# Patient Record
Sex: MALE | Race: BLACK OR AFRICAN AMERICAN | Employment: OTHER | URBAN - METROPOLITAN AREA
[De-identification: names, ages, dates, MRNs, and addresses within clinical notes are randomized per-mention and may not be internally consistent; named-entity substitution may affect disease eponyms.]

---

## 2018-03-12 DIAGNOSIS — R91.1 PULMONARY NODULE: Primary | ICD-10-CM

## 2018-03-13 ENCOUNTER — TELEPHONE (OUTPATIENT)
Dept: PULMONOLOGY | Age: 48
End: 2018-03-13

## 2018-03-19 ENCOUNTER — OFFICE VISIT (OUTPATIENT)
Dept: PULMONOLOGY | Age: 48
End: 2018-03-19
Payer: COMMERCIAL

## 2018-03-19 ENCOUNTER — HOSPITAL ENCOUNTER (OUTPATIENT)
Dept: CT IMAGING | Age: 48
Discharge: HOME | End: 2018-03-21
Payer: COMMERCIAL

## 2018-03-19 VITALS — OXYGEN SATURATION: 94 % | BODY MASS INDEX: 45.1 KG/M2 | WEIGHT: 315 LBS | HEIGHT: 70 IN

## 2018-03-19 DIAGNOSIS — R91.1 PULMONARY NODULE: Primary | ICD-10-CM

## 2018-03-19 DIAGNOSIS — J41.1 MUCOPURULENT CHRONIC BRONCHITIS (HCC): ICD-10-CM

## 2018-03-19 DIAGNOSIS — D35.01 ADENOMA OF RIGHT ADRENAL GLAND: ICD-10-CM

## 2018-03-19 DIAGNOSIS — G47.33 OBSTRUCTIVE SLEEP APNEA SYNDROME: ICD-10-CM

## 2018-03-19 DIAGNOSIS — R91.1 PULMONARY NODULE: ICD-10-CM

## 2018-03-19 DIAGNOSIS — F17.200 SMOKER: ICD-10-CM

## 2018-03-19 LAB
DLCO %PRED: NORMAL
DLCO/VA %PRED: NORMAL
DLCO: NORMAL ML/MMHG SEC
FEF 25-75% PRE PRED: 68
FEF 25-75%-PRE: 2.18
FEV1 %PRED-PRE: 85
FEV1/FVC PRED: 92
FEV1/FVC: 74 %
FEV1: 2.92 LITERS
FEV3 PRE: 3.69
FVC %PRED-PRE: 93
FVC: 3.96 LITERS
MEP: NORMAL
MIP: NORMAL
PEF %PRED-PRE: NORMAL
PEF-PRE: 467.8 L/SEC
TLC %PRED: NORMAL
TLC: NORMAL LITERS

## 2018-03-19 PROCEDURE — 6360000002 HC RX W HCPCS

## 2018-03-19 PROCEDURE — 71250 CT THORAX DX C-: CPT

## 2018-03-19 PROCEDURE — 99205 OFFICE O/P NEW HI 60 MIN: CPT | Performed by: INTERNAL MEDICINE

## 2018-03-19 PROCEDURE — 94010 BREATHING CAPACITY TEST: CPT | Performed by: INTERNAL MEDICINE

## 2018-03-19 PROCEDURE — 99203 OFFICE O/P NEW LOW 30 MIN: CPT | Performed by: INTERNAL MEDICINE

## 2018-03-19 PROCEDURE — 90670 PCV13 VACCINE IM: CPT | Performed by: INTERNAL MEDICINE

## 2018-03-19 PROCEDURE — G0009 ADMIN PNEUMOCOCCAL VACCINE: HCPCS

## 2018-03-19 PROCEDURE — 90670 PCV13 VACCINE IM: CPT

## 2018-03-19 RX ORDER — CHOLECALCIFEROL (VITAMIN D3) 1250 MCG
1 CAPSULE ORAL DAILY
COMMUNITY

## 2018-03-19 RX ORDER — VALSARTAN 320 MG/1
320 TABLET ORAL DAILY
COMMUNITY
End: 2021-06-24

## 2018-03-19 ASSESSMENT — PULMONARY FUNCTION TESTS
FEV1/FVC_PREDICTED: 92
FVC: 3.96
FEV1/FVC: 74
FVC_PERCENT_PREDICTED_PRE: 93
FEV1_PERCENT_PREDICTED_PRE: 85
FEV1: 2.92

## 2018-03-19 NOTE — PATIENT INSTRUCTIONS
Patient Education        Learning About Chronic Bronchitis  What is chronic bronchitis? Chronic bronchitis is long-term swelling and the buildup of mucus in the airways of your lungs. The airways (bronchial tubes) get inflamed and make a lot of mucus. This can narrow or block the airways, making it hard for you to breathe. It is a form of COPD (chronic obstructive pulmonary disease). Chronic bronchitis is usually caused by smoking. But chemical fumes, dust, or air pollution also can cause it over time. What can you expect when you have chronic bronchitis? Chronic bronchitis gets worse over time. You cannot undo the damage to your lungs. Over time, you may find that:  · You get short of breath even when you do simple things like get dressed or fix a meal.  · It is hard to eat or exercise. · You lose weight and feel weaker. Over many years, the swelling and mucus from chronic bronchitis make it more likely that you will get lung infections. But there are things you can do to prevent more damage and feel better. What are the symptoms? The main symptoms of chronic bronchitis are:  · A cough that will not go away. · Mucus that comes up when you cough. · Shortness of breath that gets worse when you exercise. At times, your symptoms may suddenly flare up and get much worse. This is a called an exacerbation (say \"egg-GRACY-er-BAY-omkar\"). When this happens, your usual symptoms quickly get worse and stay bad. This can be dangerous. You may have to go to the hospital.  How can you keep chronic bronchitis from getting worse? Don't smoke. That is the best way to keep chronic bronchitis from getting worse. If you already smoke, it is never too late to stop. If you need help quitting, talk to your doctor about stop-smoking programs and medicines. These can increase your chances of quitting for good. You can do other things to keep chronic bronchitis from getting worse:  · Avoid bad air.  Air pollution, chemical is right for you. Rehab includes exercise programs, education about your disease and how to manage it, help with diet and other changes, and emotional support. · Eat regular, healthy meals. Use bronchodilators about 1 hour before you eat to make it easier to eat. Eat several small meals instead of three large ones. Drink beverages at the end of the meal. Avoid foods that are hard to chew. Follow-up care is a key part of your treatment and safety. Be sure to make and go to all appointments, and call your doctor if you are having problems. It's also a good idea to know your test results and keep a list of the medicines you take. Where can you learn more? Go to https://Plum.iopeZipList.J[employer]J Solutions. org and sign in to your Battlefy account. Enter P729 in the [facility] box to learn more about \"Learning About Chronic Bronchitis. \"     If you do not have an account, please click on the \"Sign Up Now\" link. Current as of: May 12, 2017  Content Version: 11.5  © 3932-1023 [employer]. Care instructions adapted under license by [facility] ([facility]). If you have questions about a medical condition or this instruction, always ask your healthcare professional. [place] [address] any warranty or liability for your use of this information. Patient Education          pneumococcal 13-valent conjugate vaccine  Pronunciation:  SAVANNAH danielle-VAY beatriz martinez VAX een  Brand:  Prevnar 15  What is the most important information I should know about this vaccine? For children, the pneumococcal 13-valent vaccine is given in a series of shots. The first shot is usually given when the child is 3 months old. The booster shots are then given at 4 months, 6 months, and 15to 13months of age. Adults usually receive only one dose of the vaccine.   In a child older than 6 months who has not yet received this vaccine, the first dose can be given any time from the age of 10 months through 5 years injection in a doctor's office or clinic setting. For children, the pneumococcal 13-valent vaccine is given in a series of shots. The first shot is usually given when the child is 3 months old. The booster shots are then given at 4 months, 6 months, and 15to 13months of age. Adults usually receive only one dose of the vaccine. The first injection should be given no earlier than 10weeks of age. Allow at least 2 months to pass between injections. If your child is older than 6 months, he or she can still receive this vaccine on the following schedule:  · Age 7-11 months: two injections at least 4 weeks apart, followed by a third injection after the child turns 1 year (at least 2 months after the second injection); · Age 12-23 months: two injections at least 2 months apart;  · Age 19 months to 5 years (before the 7th birthday): one injection. The timing of this vaccination is very important for it to be effective. Your child's individual booster schedule may be different from these guidelines. Follow your doctor's instructions or the schedule recommended by the health department of the state you live in. Your doctor may recommend treating fever and pain with an aspirin-free pain reliever such as acetaminophen (Tylenol) or ibuprofen (Motrin, Advil, and others) when the shot is given and for the next 24 hours. Follow the label directions or your doctor's instructions about how much of this medicine to give your child. It is especially important to prevent fever from occurring in a child who has a seizure disorder such as epilepsy. Be sure to keep your child on a regular schedule for other immunizations such as diphtheria, tetanus, pertussis (whooping cough), hepatitis, and varicella (chicken pox). Your doctor or state health department can provide you with a recommended immunization schedule. What happens if I miss a dose? Contact your doctor if your child will miss a booster dose or gets behind schedule.  The Services at [phone]. What other drugs will affect this vaccine? Before receiving this vaccine, tell the doctor about all other vaccines you or your child have recently received. Also tell the doctor if you or your child have recently received drugs or treatments that can weaken the immune system, including:  · an oral, nasal, inhaled, or injectable steroid medicine;  · chemotherapy or radiation;  · medications to treat psoriasis, rheumatoid arthritis, or other autoimmune disorders, such as azathioprine (Imuran), etanercept (Enbrel), leflunomide ([address]), and others; or  · medicines to treat or prevent organ transplant rejection, such as basiliximab (Simulect), cyclosporine (Sandimmune, Neoral, Gengraf), muromonab CD3 (Orthoclone), mycophenolate mofetil (CellCept), sirolimus (Rapamune), or tacrolimus (Prograf). If you are using any of these medications, you may not be able to receive the vaccine, or may need to wait until the other treatments are finished. There may be other drugs that can interact with pneumococcal 13-valent vaccine. Tell your doctor about all medications you use. This includes prescription, over-the-counter, vitamin, and herbal products. Do not start a new medication without telling your doctor. Where can I get more information? Your doctor or pharmacist can provide more information about this vaccine. Additional information is available from your local health department or the Centers for Disease Control and Prevention. Remember, keep this and all other medicines out of the reach of children, never share your medicines with others, and use this medication only for the indication prescribed. Every effort has been made to ensure that the information provided by [address] is accurate, up-to-date, and complete, but no guarantee is made to that effect. Drug information contained herein may be time sensitive.  Multum information has been compiled for use by

## 2018-03-19 NOTE — PROGRESS NOTES
PLAN:    His current description of his cough and his active smoking is consistent with the diagnosis of chronic bronchitis. The CT scan shows some's subtle changes such as signet rings and increasing bronchial thickening. I started him on bronchodilators with samples of Belvespi 2 puffs twice a day. We did a PSA screen - at his request. I discussed weight loss with him and recommended dietary counseling. Smoking sensation was discussed and a referral to her smoking sensation program.  As for the lung nodules, I am unconcerned or worried about the lesions which we did review in the office on Epic we discussed a repeat CT scan in 1 year may be reasonable. We reviewed his vaccines and administered the Prevnar 13. We also discussed his sleep apnea and given that his index was greater than 90 and he is morbidly obese recommend evaluation for obesity hypoventilation syndrome and requested full pulmonary function assessment with arterial blood gas on room air. I will have him seen by the  to see if he can get prescription assistance due to his unstable work environment. An S4 the abnormal adrenal gland lesion and MRI for adenoma protocol likely will be enough to eliminate further diagnostic testing. We hope this updates you on our evaluation and clinical thinking. Thank you for entrusting us to participate in [facility]. If you have any questions, please don't hesitate to call us at the [facility].          Sincerely,      Katie Mcnally D.O., MPH, [facility]   of Internal Medicine  Director of [employer]

## 2018-03-30 ENCOUNTER — HOSPITAL ENCOUNTER (OUTPATIENT)
Age: 48
Discharge: HOME | End: 2018-04-01
Payer: COMMERCIAL

## 2018-03-30 LAB
ALBUMIN SERPL-MCNC: 3.8 G/DL (ref 3.5–5.2)
ALP BLD-CCNC: 48 U/L (ref 40–129)
ALT SERPL-CCNC: 8 U/L (ref 0–40)
ANION GAP SERPL CALCULATED.3IONS-SCNC: 13 MMOL/L (ref 7–16)
AST SERPL-CCNC: 15 U/L (ref 0–39)
BASOPHILS ABSOLUTE: 0.02 E9/L (ref 0–0.2)
BASOPHILS RELATIVE PERCENT: 0.3 % (ref 0–2)
BILIRUB SERPL-MCNC: 0.4 MG/DL (ref 0–1.2)
BUN BLDV-MCNC: 8 MG/DL (ref 6–20)
CALCIUM SERPL-MCNC: 9.4 MG/DL (ref 8.6–10.2)
CHLORIDE BLD-SCNC: 103 MMOL/L (ref 98–107)
CHOLESTEROL, TOTAL: 126 MG/DL (ref 0–199)
CO2: 27 MMOL/L (ref 22–29)
CREAT SERPL-MCNC: 0.9 MG/DL (ref 0.7–1.2)
EOSINOPHILS ABSOLUTE: 0.47 E9/L (ref 0.05–0.5)
EOSINOPHILS RELATIVE PERCENT: 6.8 % (ref 0–6)
GFR AFRICAN AMERICAN: >60
GFR NON-AFRICAN AMERICAN: >60 ML/MIN/1.73
GLUCOSE BLD-MCNC: 112 MG/DL (ref 74–109)
HCT VFR BLD CALC: 44 % (ref 37–54)
HDLC SERPL-MCNC: 51 MG/DL
HEMOGLOBIN: 14.1 G/DL (ref 12.5–16.5)
IMMATURE GRANULOCYTES #: 0.01 E9/L
IMMATURE GRANULOCYTES %: 0.1 % (ref 0–5)
LDL CHOLESTEROL CALCULATED: 58 MG/DL (ref 0–99)
LYMPHOCYTES ABSOLUTE: 2.8 E9/L (ref 1.5–4)
LYMPHOCYTES RELATIVE PERCENT: 40.5 % (ref 20–42)
MCH RBC QN AUTO: 28.7 PG (ref 26–35)
MCHC RBC AUTO-ENTMCNC: 32 % (ref 32–34.5)
MCV RBC AUTO: 89.6 FL (ref 80–99.9)
MONOCYTES ABSOLUTE: 0.59 E9/L (ref 0.1–0.95)
MONOCYTES RELATIVE PERCENT: 8.5 % (ref 2–12)
NEUTROPHILS ABSOLUTE: 3.03 E9/L (ref 1.8–7.3)
NEUTROPHILS RELATIVE PERCENT: 43.8 % (ref 43–80)
PDW BLD-RTO: 16.2 FL (ref 11.5–15)
PLATELET # BLD: 259 E9/L (ref 130–450)
PMV BLD AUTO: 10.3 FL (ref 7–12)
POTASSIUM SERPL-SCNC: 4.4 MMOL/L (ref 3.5–5)
RBC # BLD: 4.91 E12/L (ref 3.8–5.8)
SODIUM BLD-SCNC: 143 MMOL/L (ref 132–146)
TOTAL PROTEIN: 7 G/DL (ref 6.4–8.3)
TRIGL SERPL-MCNC: 85 MG/DL (ref 0–149)
TSH SERPL DL<=0.05 MIU/L-ACNC: 1.58 UIU/ML (ref 0.27–4.2)
VITAMIN D 25-HYDROXY: 27 NG/ML (ref 30–100)
VLDLC SERPL CALC-MCNC: 17 MG/DL
WBC # BLD: 6.9 E9/L (ref 4.5–11.5)

## 2018-03-30 PROCEDURE — 80061 LIPID PANEL: CPT

## 2018-03-30 PROCEDURE — 85025 COMPLETE CBC W/AUTO DIFF WBC: CPT

## 2018-03-30 PROCEDURE — 80053 COMPREHEN METABOLIC PANEL: CPT

## 2018-03-30 PROCEDURE — 82306 VITAMIN D 25 HYDROXY: CPT

## 2018-03-30 PROCEDURE — 84443 ASSAY THYROID STIM HORMONE: CPT

## 2018-06-26 DIAGNOSIS — F17.200 SMOKER: ICD-10-CM

## 2018-07-30 ENCOUNTER — OFFICE VISIT (OUTPATIENT)
Dept: PULMONOLOGY | Age: 48
End: 2018-07-30
Payer: COMMERCIAL

## 2018-07-30 VITALS
HEART RATE: 95 BPM | WEIGHT: 315 LBS | TEMPERATURE: 100 F | BODY MASS INDEX: 45.1 KG/M2 | RESPIRATION RATE: 16 BRPM | HEIGHT: 70 IN | OXYGEN SATURATION: 94 % | SYSTOLIC BLOOD PRESSURE: 169 MMHG | DIASTOLIC BLOOD PRESSURE: 83 MMHG

## 2018-07-30 DIAGNOSIS — J20.9 ACUTE BRONCHITIS, UNSPECIFIED ORGANISM: Primary | ICD-10-CM

## 2018-07-30 DIAGNOSIS — J41.1 MUCOPURULENT CHRONIC BRONCHITIS (HCC): ICD-10-CM

## 2018-07-30 DIAGNOSIS — R91.1 PULMONARY NODULE: ICD-10-CM

## 2018-07-30 DIAGNOSIS — G47.33 OBSTRUCTIVE SLEEP APNEA SYNDROME: ICD-10-CM

## 2018-07-30 LAB
DLCO %PRED: NORMAL
DLCO/VA %PRED: NORMAL
DLCO: NORMAL ML/MMHG SEC
FEF 25-75% PRE PRED: 61
FEF 25-75%-PRE: 1.95
FEV1 %PRED-PRE: 77
FEV1/FVC PRED: 80
FEV1/FVC: 74 %
FEV1: 2.62 LITERS
FEV3 PRE: 3.36
FVC %PRED-PRE: 82
FVC: 3.52 LITERS
MEP: NORMAL
MIP: NORMAL
PEF %PRED-PRE: NORMAL
PEF-PRE: NORMAL L/SEC
TLC %PRED: NORMAL
TLC: NORMAL LITERS

## 2018-07-30 PROCEDURE — G8417 CALC BMI ABV UP PARAM F/U: HCPCS | Performed by: INTERNAL MEDICINE

## 2018-07-30 PROCEDURE — 99213 OFFICE O/P EST LOW 20 MIN: CPT | Performed by: INTERNAL MEDICINE

## 2018-07-30 PROCEDURE — 1036F TOBACCO NON-USER: CPT | Performed by: INTERNAL MEDICINE

## 2018-07-30 PROCEDURE — 94375 RESPIRATORY FLOW VOLUME LOOP: CPT | Performed by: INTERNAL MEDICINE

## 2018-07-30 PROCEDURE — 99215 OFFICE O/P EST HI 40 MIN: CPT | Performed by: INTERNAL MEDICINE

## 2018-07-30 PROCEDURE — G8427 DOCREV CUR MEDS BY ELIG CLIN: HCPCS | Performed by: INTERNAL MEDICINE

## 2018-07-30 PROCEDURE — G8925 SPIR FEV1/FVC>=60% & NO COPD: HCPCS | Performed by: INTERNAL MEDICINE

## 2018-07-30 PROCEDURE — 94010 BREATHING CAPACITY TEST: CPT | Performed by: INTERNAL MEDICINE

## 2018-07-30 PROCEDURE — 3023F SPIROM DOC REV: CPT | Performed by: INTERNAL MEDICINE

## 2018-07-30 RX ORDER — PREDNISONE 10 MG/1
10 TABLET ORAL DAILY
Qty: 30 TABLET | Refills: 0 | Status: SHIPPED | OUTPATIENT
Start: 2018-07-30 | End: 2018-08-29

## 2018-07-30 RX ORDER — ALBUTEROL SULFATE 90 UG/1
2 AEROSOL, METERED RESPIRATORY (INHALATION) EVERY 4 HOURS PRN
Qty: 1 INHALER | Refills: 6 | Status: SHIPPED | OUTPATIENT
Start: 2018-07-30 | End: 2018-08-29

## 2018-07-30 RX ORDER — LEVOFLOXACIN 750 MG/1
750 TABLET ORAL DAILY
Qty: 14 TABLET | Refills: 0 | Status: SHIPPED | OUTPATIENT
Start: 2018-07-30 | End: 2018-08-09

## 2018-07-30 ASSESSMENT — PULMONARY FUNCTION TESTS
FEV1/FVC_PREDICTED: 80
FEV1/FVC: 74
FVC_PERCENT_PREDICTED_PRE: 82
FEV1_PERCENT_PREDICTED_PRE: 77
FEV1: 2.62
FVC: 3.52

## 2018-07-30 NOTE — PROGRESS NOTES
Pulmonary [address] [facility]    Department of Internal Medicine  Division of Pulmonary, Critical Care and Sleep Medicine      Dear Pawel Hong MD    We had the pleasure of seeing Garret Ann, in the [address] at [facility] regarding his lung nodule, sleep apnea, cough consistent with chronic bronchitis and morbid obesity. HISTORY OF PRESENT ILLNESS:    Garret Ann is a 49 y.o. male same morbidly obese -American gentleman who presents for evaluation of a cough that has been persistent for about 1 year. States the cough recently got worse with green mucus production over the past few months. He is a one pack per week smoker and is been smoking more since having difficulty with regular employment. He is a  by trade and [employer]. There is no extensive family history of chronic obstructive pulmonary disease noted. He did not have asthma as a child. He has no known allergies except for nonsteroidal's. For the cough he was placed on albuterol and a chest x-ray was performed showing a right mid lung field pulmonary nodule. With the above issues pulmonary consultation was requested. In addition to the above he has known sleep apnea. He has an index of greater than 90 with nocturnal hypoxemia in 2014 I recommended CPAP at 9 cm of water pressure he currently states he is on 15 cm of water pressure and is followed by Dr. Juan Francisco Blanco in his sleep laboratory. Significant weight gain has been noted over the past 6 months. Strong family history of breast cancer noted. On today's visit, Garret Ann was seen emergently at your request. Jerevikram Vazquezjocelin did not return for two appointment nor did he get all his testing done as requested last visit. Dispite all this he recently has been SOB with worsening cough and wheezing. He did quit smoking since the last visit after finding out about the lung nodule. He denies any chest pain.   He has no pleurisy, hemoptysis or weight loss. He denies any leg swelling. He lung function has declined 17% since the last visit. ALLERGIES:    Allergies   Allergen Reactions    Naproxen        PAST MEDICAL HISTORY:       Diagnosis Date    Adrenal adenoma     Family history of breast cancer     Hypertension     Morbid obesity ([address]     Mucopurulent chronic bronchitis ([facility] [address] 3/19/2018    Obstructive sleep apnea syndrome 3/19/2018    Pulmonary nodule 3/19/2018    Sleep apnea     Smoker         MEDICATIONS:   Current Outpatient Prescriptions   Medication Sig Dispense Refill    levofloxacin (LEVAQUIN) 750 MG tablet Take 1 tablet by mouth daily for 10 days 14 tablet 0    predniSONE (DELTASONE) 10 MG tablet Take 1 tablet by mouth daily Take 4 tabs x 3 days, 3 tabs x 3 days, 2 tabs x 3 days, 1 tab x 3 days, stop. 30 tablet 0    Fluticasone-Umeclidin-Vilant 100-62.5-25 MCG/INH AEPB Inhale 100 mcg into the lungs daily 1 each 0    albuterol sulfate HFA (PROAIR HFA) 108 (90 Base) MCG/ACT inhaler Inhale 2 puffs into the lungs every 4 hours as needed for Wheezing or Shortness of Breath 1 Inhaler 6    HYDROcodone-homatropine (HYCODAN) 5-1.5 MG/5ML syrup Take 2.5 mLs by mouth 4 times daily as needed (cough) for up to 7 days. . 120 mL 0    glycopyrrolate-formoterol (BEVESPI) 9-4.8 MCG/ACT AERO Administered as two inhalations taken twice daily in the morning and in the evening by the orally inhaled route only. 1 Inhaler 0    Cholecalciferol (VITAMIN D3) 50059 units CAPS Take 1 capsule by mouth daily      hydrochlorothiazide (HYDRODIURIL) 25 MG tablet Take 25 mg by mouth daily.  valsartan (DIOVAN) 320 MG tablet Take 320 mg by mouth daily      famotidine (PEPCID) 20 MG tablet Take 20 mg by mouth 2 times daily.  verapamil (CALAN) 120 MG tablet Take 120 mg by mouth once.  famotidine (PEPCID) 20 MG tablet Take 1 tablet by mouth 2 times daily for 7 days.  14 tablet 0     No current infections. Nose/Throat: Patient denies history of rhinitis, chronic nasal discharge, drainage, epistaxis, obstruction or nasal polyps. Patient denies history of soreness of mouth or tongue. Patient denies history of hoarseness, voice changes, sore throats or recurrent tonsillitis. Dental surgery with extraction and perioral abscess of the neck requiring drainage. Lymphatic:  Patient denies history of enlargement, inflammation, pain or suppuration. He denies history of lymphoma. Cardiovascular:  Patient denies history of palpations, heart murmur, irregular rhythm, chest pain. He denies orthopnea, rheumatic fever, scarlet fever, cold extremities. He denies edema. Pulmonary:  Patient admits to exertional dyspnea, nocturnal dyspnea. He complains of cough. He complains of hemoptysis or pleurisy. He complains of sleep disorder. He reports symptoms of sleep apnea. CPAP 15 cm H20. Gastrointestinal: Patient denies changes in appetite. He denies dysphagia, odynophagia or abdominal pain. He denies hematochezia, melena, bowel habit changes or hemorrhoids. Allergy/Immunology: The patient denies itching, hives, or angioedema. The patient admits to a problem with seasonal/environmental allergies. Genitourinary:  The patient denies a history of stones or UTI. Vascular: No history of peripheral vascular disease, carotid occlusive disease or aneurysms. Musculoskeletal: The patient reports a history of arthritis & joint pains. He denies loss of strength. Breasts: He denies history of masses, lumps, pain, or nipple changes. The patient denies a history of breast cancer. Neurological: Patient denies vertigo. He denies twitching, convulsions, loss of consciousness. No memory problems. Psychological: Patient denies moodiness, depression or anxiety. He denies obsessions, delusions, illusions or hallucinations. Cancer: No history of cancer reported. Endocrine: No history of goiter.  No history of thyroid disorders. He denies diabetes. Hematopoietic:  He denies history of bleeding disorders or easy bruising. He denies hypercoagulable disorder. Integumentory: No skin lesion, rashes, venous stasis or varicose veins. They denies any report of skin cancer. Rheumatic:  The patient denies polyarthritis or inflammatory joint disease. PHYSICAL EXAMINATION:   Vitals:    07/30/18 1606   BP: (!) 169/83   Site: Left Arm   Position: Sitting   Pulse: 95   Resp: 16   Temp: 100 °F (37.8 °C)   TempSrc: Oral   SpO2: 94%   Weight: (!) 355 lb (161 kg)   Height: 5' 10\" (1.778 m)     Constitutional: A morbidly obese 49 y.o. male who is alert, oriented, cooperative and in no apparent distress. Head was normocephalic and atraumatic. EENT: EOMI DWAYNE. MMM. No icterus. No conjunctival injections. External canals are patent and no discharge was appreciated. Septum was midline, mucosa was without erythema, exudates or cobblestoning. No thrush. Neck: Supple without thyromegaly. No elevated JVP. Trachea was midline. No carotid bruits. Respiratory: Symmetrical without dullness to percussion. Bilaterally diffuse wheezes. No rhonchi or rales. No intercostal retraction or use of accessory muscles. No egophony. Cardiovascular: Nonpalpable PMI. Regular without murmur, clicks, gallops or rubs. No left or right ventricular heave. Pulses:  Carotid, radial and femoral pulses were equal bilaterally. Abdomen: Obese, rounded and soft without organomegaly. No rebound, rigidity. Lymphatic: No lymphadenopathy. Musculoskeletal: Ambulates without assistance. Normal curvature of the spine. No gross muscle weakness. Muscle size, tone and strength are normal. No involuntary movements. Extremities:  No lower extremity edema, ulcerations, tenderness, varicosities or erythema. Coordination appears adequate. Sensory function appears intact. Deep tendon reflexes are normal.   Skin:  Warm and dry.   Examination of color, turgor and pigmentation was relatively normal. No bruises or skin rashes. Old surgical scars are noted. Neurological/Psychiatric: General behavior, level of consciousness, thought content is normal. The patient's emotional status is normal.  Cranial nerves II-XII are intact. DATA:   The data collected below information that was obtained, reviewed, analyzed and interpreted today. Todays Office Spirometry was compared to previous test if available and demonstrates an FVC of 3.52 liters which is 82 % of predicted with an FEV1 of 2.62 liters which is 77 which is down from 85 % of predicted. FEV1/FVC ratio is 74 %. Mid expiratory flow rates are 49 % of predicted. Maximum voluntary ventilation is 134 liters per minute or 86 % of predicted. Flow volume loop shows no signs of intrathoracic or extrathoracic process. Impression: New airflow obstruction. SLEEP STUDY: 2013:  IMPRESSION:  1. Morbid obesity. 2.    Hypersomnolence indicated by sleep onset of 8 minutes. 3.    Fragmented sleep. 4.    Frequent arousals. 5.    Severe sleep apnea syndrome with an AHI of 80.   6.    No cardiac dysrhythmias. 7.    No leg movement disorder. RECOMMENDATIONS: 1. Weight loss. 2.    Start CPAP at 9 cm of water pressure using a medium wide ResMed FX    nasal mask with heat humidification. 3.    Counseled on driving or using heavy equipment until the sleep disorder  is treated. CT SCAN CHEST [3/2018]: Right lun. Pulmonary nodule (series 3, image 45), measuring approximately 0.37 cm. 2. Pulmonary nodule (series 3, image 35), measuring approximately 0.47 cm. 3. No focal infiltrate/pneumonia, pneumothorax or pleural effusion is identified. Left lun. No noncalcified pulmonary nodule, spiculated mass, pleural effusion, or pneumothorax is identified. No supraclavicular, axillary, mediastinal lymphadenopathy. Hilar lymphadenopathy is difficult to ascertain given the lack of IV contrast administration. . Visualized portions of the thyroid, heart, esophagus, stomach, gallbladder, liver, left adrenals, spleen, pancreas, pancreatic duct, common bile duct and visualized kidneys are unremarkable. Vertebral body heights and intervertebral disc spaces are well-preserved. There is normal sagittal alignment of the visualized spine. No lytic or blastic bony lesions. . Nodular abnormality projecting from the right adrenal gland, demonstrating Hounsfield unit on average characterization of 8.2 on average. Lesion measures approximately 1.9 cm longitudinal by 1.2 cm transverse. Hemoglobin/Hematocrit:    Lab Results   Component Value Date    HGB 14.1 03/30/2018    HCT 44.0 03/30/2018     BUN/Creatinine:    Lab Results   Component Value Date    BUN 8 03/30/2018    CREATININE 0.9 03/30/2018          IMPRESSION:      Jazmyn Zavaleta is a 49 y.o. male with descriptive evidence of chronic bronchitis who was an active smoker with a abnormal x-ray revealing a right middle lung field nodule which prompted a CT scan of the chest revealing small sub-centimeter nodules and adrenal adenoma. He was seen back with acute exacerbation of COPD. With this in mind, we would like to proceed with the following;                      PLAN:     We discussed that the current plan is steroids, cough medication and antibiotics for two weeks then return to the office to reevaluate the treatment and resume the work up. We changed the Bevespi to Trelegy till seen and made sure he has a rescue inhaler. His current description of his cough and his active smoking is consistent with the diagnosis of chronic bronchitis. The CT scan shows some's subtle changes such as signet rings and increasing bronchial thickening. I started him on bronchodilators with samples of Belvespi 2 puffs twice a day. We did a PSA screen - at his request. I discussed weight loss with him and recommended dietary counseling.  Smoking sensation was discussed and a referral to her smoking sensation program.

## 2018-07-30 NOTE — PATIENT INSTRUCTIONS
before you increase the amount of fluids you drink. · If you have mucus in your airways, it may help to breathe deeply and cough. · Do not smoke or allow others to smoke around you. Smoking can make your wheezing worse. If you need help quitting, talk to your doctor about stop-smoking programs and medicines. These can increase your chances of quitting for good. · Avoid things that may cause your wheezing. These may include colds, smoke, air pollution, dust, pollen, pets, cockroaches, stress, and cold air. When should you call for help? Call 911 anytime you think you may need emergency care. For example, call if:    · You have severe trouble breathing.     · You passed out (lost consciousness).    Call your doctor now or seek immediate medical care if:    · You cough up yellow, dark brown, or bloody mucus (sputum).     · You have new or worse shortness of breath.     · Your wheezing is not getting better or it gets worse after you start taking your medicine.    Watch closely for changes in your health, and be sure to contact your doctor if:    · You do not get better as expected. Where can you learn more? Go to https://Your Body by DesignpepicDefenCall.PayProp. org and sign in to your Doodle Mobile account. Enter 552 1211 in the [facility] box to learn more about \"Wheezing or Bronchoconstriction: Care Instructions. \"     If you do not have an account, please click on the \"Sign Up Now\" link. Current as of: December 6, 2017  Content Version: 11.6  © 8364-8277 [employer], [employer]. Care instructions adapted under license by [facility] ([facility]). If you have questions about a medical condition or this instruction, always ask your healthcare professional. [place] [address] any warranty or liability for your use of this information.

## 2018-08-13 ENCOUNTER — OFFICE VISIT (OUTPATIENT)
Dept: PULMONOLOGY | Age: 48
End: 2018-08-13
Payer: COMMERCIAL

## 2018-08-13 VITALS
RESPIRATION RATE: 16 BRPM | HEIGHT: 70 IN | OXYGEN SATURATION: 96 % | TEMPERATURE: 98.1 F | BODY MASS INDEX: 45.1 KG/M2 | WEIGHT: 315 LBS | HEART RATE: 90 BPM | DIASTOLIC BLOOD PRESSURE: 69 MMHG | SYSTOLIC BLOOD PRESSURE: 129 MMHG

## 2018-08-13 DIAGNOSIS — J45.30 MILD PERSISTENT REACTIVE AIRWAY DISEASE WITHOUT COMPLICATION: ICD-10-CM

## 2018-08-13 DIAGNOSIS — E66.01 MORBID OBESITY (HCC): ICD-10-CM

## 2018-08-13 DIAGNOSIS — R91.1 PULMONARY NODULE: ICD-10-CM

## 2018-08-13 DIAGNOSIS — G47.33 OBSTRUCTIVE SLEEP APNEA SYNDROME: Primary | ICD-10-CM

## 2018-08-13 DIAGNOSIS — E27.9 LESION OF ADRENAL GLAND (HCC): ICD-10-CM

## 2018-08-13 PROBLEM — J45.909 REACTIVE AIRWAY DISEASE: Status: ACTIVE | Noted: 2018-08-13

## 2018-08-13 LAB
DLCO %PRED: NORMAL
DLCO/VA %PRED: NORMAL
DLCO: NORMAL ML/MMHG SEC
FEF 25-75% PRE PRED: 3.18
FEF 25-75%-PRE: 2.76
FEV1 %PRED-PRE: 102
FEV1/FVC PRED: 80
FEV1/FVC: 76 %
FEV1: 3.46 LITERS
FEV3 PRE: 4.22
FVC %PRED-PRE: 107
FVC: 4.55 LITERS
MEP: NORMAL
MIP: NORMAL
PEF %PRED-PRE: 8.88
PEF-PRE: 10.19 L/SEC
TLC %PRED: NORMAL
TLC: NORMAL LITERS

## 2018-08-13 PROCEDURE — 94010 BREATHING CAPACITY TEST: CPT | Performed by: INTERNAL MEDICINE

## 2018-08-13 PROCEDURE — 99213 OFFICE O/P EST LOW 20 MIN: CPT | Performed by: INTERNAL MEDICINE

## 2018-08-13 PROCEDURE — G0009 ADMIN PNEUMOCOCCAL VACCINE: HCPCS

## 2018-08-13 PROCEDURE — G8417 CALC BMI ABV UP PARAM F/U: HCPCS | Performed by: INTERNAL MEDICINE

## 2018-08-13 PROCEDURE — G0009 ADMIN PNEUMOCOCCAL VACCINE: HCPCS | Performed by: INTERNAL MEDICINE

## 2018-08-13 PROCEDURE — 1036F TOBACCO NON-USER: CPT | Performed by: INTERNAL MEDICINE

## 2018-08-13 PROCEDURE — 99215 OFFICE O/P EST HI 40 MIN: CPT | Performed by: INTERNAL MEDICINE

## 2018-08-13 PROCEDURE — G8427 DOCREV CUR MEDS BY ELIG CLIN: HCPCS | Performed by: INTERNAL MEDICINE

## 2018-08-13 PROCEDURE — 90732 PPSV23 VACC 2 YRS+ SUBQ/IM: CPT

## 2018-08-13 PROCEDURE — 6360000002 HC RX W HCPCS

## 2018-08-13 RX ORDER — LOSARTAN POTASSIUM 100 MG/1
100 TABLET ORAL DAILY
COMMUNITY
End: 2020-07-08 | Stop reason: SDUPTHER

## 2018-08-13 ASSESSMENT — PULMONARY FUNCTION TESTS
FEV1_PERCENT_PREDICTED_PRE: 102
FEV1/FVC: 76
FVC: 4.55
FEV1/FVC_PREDICTED: 80
FEV1: 3.46
FVC_PERCENT_PREDICTED_PRE: 107

## 2018-08-13 NOTE — PATIENT INSTRUCTIONS
Patient Education        Asthma in Adults: Care Instructions  Your Care Instructions    During an asthma attack, your airways swell and narrow as a reaction to certain things (triggers). This makes it hard to breathe. You may be able to prevent asthma attacks if you avoid the things that set off your asthma symptoms. Keeping your asthma under control and treating symptoms before they get bad can help you avoid severe attacks. If you can control your asthma, you may be able to do all of your normal daily activities. You may also avoid asthma attacks and trips to the hospital.  Follow-up care is a key part of your treatment and safety. Be sure to make and go to all appointments, and call your doctor if you are having problems. It's also a good idea to know your test results and keep a list of the medicines you take. How can you care for yourself at home? · Follow your asthma action plan so you can manage your symptoms at home. An asthma action plan will help you prevent and control airway reactions and will tell you what to do during an asthma attack. If you do not have an asthma action plan, work with your doctor to build one. · Take your asthma medicine exactly as prescribed. Medicine plays an important role in controlling asthma. Talk to your doctor right away if you have any questions about what to take and how to take it. ¨ Use your quick-relief medicine when you have symptoms of an attack. Quick-relief medicine often is an albuterol inhaler. Some people need to use quick-relief medicine before they exercise. ¨ Take your controller medicine every day, not just when you have symptoms. Controller medicine is usually an inhaled corticosteroid. The goal is to prevent problems before they occur. Do not use your controller medicine to try to treat an attack that has already started. It does not work fast enough to help.   ¨ If your doctor prescribed corticosteroid pills to use during an attack, take them as

## 2018-08-13 NOTE — PROGRESS NOTES
Pulmonary [facility]    Department of Internal Medicine  Division of Pulmonary, Critical Care and Sleep Medicine      Dear Rajni Pinzon MD    We had the pleasure of seeing Ana Rosa Rooney, in the [address] at [facility] regarding his lung nodule, sleep apnea, cough consistent with chronic bronchitis and morbid obesity. HISTORY OF PRESENT ILLNESS:    Ana Rosa Rooney is a 49 y.o. male same morbidly obese -American gentleman who presents for evaluation of a cough that has been persistent for about 1 year. States the cough recently got worse with green mucus production over the past few months. He is a one pack per week smoker and is been smoking more since having difficulty with regular employment. 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 There is no extensive family history of chronic obstructive pulmonary disease noted. He did not have asthma as a child. He has no known allergies except for nonsteroidal's. For the cough he was placed on albuterol and a chest x-ray was performed showing a right mid lung field pulmonary nodule. With the above issues pulmonary consultation was requested. In addition to the above he has known sleep apnea. He has an index of greater than 90 with nocturnal hypoxemia in 2014 I recommended CPAP at 9 cm of water pressure he currently states he is on 15 cm of water pressure and is followed by Dr. Fran Dunlap in his sleep laboratory. Significant weight gain has been noted over the past 6 months. Strong family history of breast cancer noted. On today's visit, Ana Rosa Rooney was seen and is 1000 x improved after his exacerbation. He is on Trelegy once a day. We discussed following up on his PSA, lung nodule and adrenal lesion. He denies any chest pain. He has no pleurisy, hemoptysis or weight loss. He denies any leg swelling.   His lung function is now normal and over 100% of predicted since the last visit. ALLERGIES:    Allergies   Allergen Reactions    Naproxen        PAST MEDICAL HISTORY:       Diagnosis Date    Adrenal adenoma     Family history of breast cancer     Hypertension     Morbid obesity ([address]     Mucopurulent chronic bronchitis ([address] 3/19/2018    Obstructive sleep apnea syndrome 3/19/2018    Pulmonary nodule 3/19/2018    Sleep apnea     Cpap 15 cm BMS    Smoker         MEDICATIONS:   Current Outpatient Prescriptions   Medication Sig Dispense Refill    losartan (COZAAR) 100 MG tablet Take 100 mg by mouth daily      Fluticasone-Umeclidin-Vilant 100-62.5-25 MCG/INH AEPB Inhale 100 mcg into the lungs daily 3 each 4    Fluticasone-Umeclidin-Vilant 100-62.5-25 MCG/INH AEPB Inhale 100 mcg into the lungs daily 1 each 0    albuterol sulfate HFA (PROAIR HFA) 108 (90 Base) MCG/ACT inhaler Inhale 2 puffs into the lungs every 4 hours as needed for Wheezing or Shortness of Breath 1 Inhaler 6    Cholecalciferol (VITAMIN D3) 91612 units CAPS Take 1 capsule by mouth daily      hydrochlorothiazide (HYDRODIURIL) 25 MG tablet Take 25 mg by mouth daily.  predniSONE (DELTASONE) 10 MG tablet Take 1 tablet by mouth daily Take 4 tabs x 3 days, 3 tabs x 3 days, 2 tabs x 3 days, 1 tab x 3 days, stop. 30 tablet 0    glycopyrrolate-formoterol (BEVESPI) 9-4.8 MCG/ACT AERO Administered as two inhalations taken twice daily in the morning and in the evening by the orally inhaled route only. 1 Inhaler 0    valsartan (DIOVAN) 320 MG tablet Take 320 mg by mouth daily      famotidine (PEPCID) 20 MG tablet Take 20 mg by mouth 2 times daily.  verapamil (CALAN) 120 MG tablet Take 120 mg by mouth once.  famotidine (PEPCID) 20 MG tablet Take 1 tablet by mouth 2 times daily for 7 days. 14 tablet 0     No current facility-administered medications for this visit. SOCIAL AND OCCUPATIONAL HEALTH:  The patient smokes 1 pack every two weeks. There is no history of TB or TB exposure.   There is disorder. Integumentory: No skin lesion, rashes, venous stasis or varicose veins. They denies any report of skin cancer. Rheumatic:  The patient denies polyarthritis or inflammatory joint disease. PHYSICAL EXAMINATION:   Vitals:    08/13/18 1327   BP: 129/69   Pulse: 90   Resp: 16   Temp: 98.1 °F (36.7 °C)   TempSrc: Oral   SpO2: 96%   Weight: (!) 348 lb (157.9 kg)   Height: 5' 10\" (1.778 m)     Constitutional: A morbidly obese 49 y.o. male who is alert, oriented, cooperative and in no apparent distress. Head was normocephalic and atraumatic. EENT: EOMI DWAYNE. MMM. No icterus. No conjunctival injections. External canals are patent and no discharge was appreciated. Septum was midline, mucosa was without erythema, exudates or cobblestoning. No thrush. Neck: Supple without thyromegaly. No elevated JVP. Trachea was midline. No carotid bruits. Respiratory: Symmetrical without dullness to percussion. Essential clear. No rhonchi or rales. No intercostal retraction or use of accessory muscles. No egophony. Cardiovascular: Nonpalpable PMI. Regular without murmur, clicks, gallops or rubs. No left or right ventricular heave. Pulses:  Carotid, radial and femoral pulses were equal bilaterally. Abdomen: Obese, rounded and soft without organomegaly. No rebound, rigidity. Lymphatic: No lymphadenopathy. Musculoskeletal: Ambulates without assistance. Normal curvature of the spine. No gross muscle weakness. Muscle size, tone and strength are normal. No involuntary movements. Extremities:  No lower extremity edema, ulcerations, tenderness, varicosities or erythema. Coordination appears adequate. Sensory function appears intact. Deep tendon reflexes are normal.   Skin:  Warm and dry. Examination of color, turgor and pigmentation was relatively normal. No bruises or skin rashes. Old surgical scars are noted.    Neurological/Psychiatric: General behavior, level of consciousness, thought content intervertebral disc spaces are well-preserved. There is normal sagittal alignment of the visualized spine. No lytic or blastic bony lesions. . Nodular abnormality projecting from the right adrenal gland, demonstrating Hounsfield unit on average characterization of 8.2 on average. Lesion measures approximately 1.9 cm longitudinal by 1.2 cm transverse. Hemoglobin/Hematocrit:    Lab Results   Component Value Date    HGB 14.1 03/30/2018    HCT 44.0 03/30/2018     BUN/Creatinine:    Lab Results   Component Value Date    BUN 8 03/30/2018    CREATININE 0.9 03/30/2018          IMPRESSION:      Garret Ann is a 49 y.o. male with descriptive evidence of chronic bronchitis who quit smoking in 8/2018 with a abnormal x-ray revealing a right middle lung field nodule which prompted a CT scan of the chest revealing small sub-centimeter nodules and adrenal adenoma. With this in mind, we would like to proceed with the following;                      PLAN:   He is back to baseline after the AECOPD after getting steroids, cough medication and antibiotics for two weeks then return to the office to reevaluate the treatment and resume the work up. As you know we changed the Bevespi to Trelegy and it really made a big difference in his breathing. In March 2018 his CT scan shows some's subtle changes such as signet rings and increasing bronchial thickening. We requested a PSA screen - at his request. I discussed weight loss with him and recommended dietary counseling. Smoking sensation was discussed and a referral to her smoking sensation program - he quit a in March/2018. As for the lung nodules, I am unconcerned or worried about the lesions which we did review in the office on Epic we discussed a repeat CT scan to follow the lesion. We reviewed his vaccines and administered the pneumonia 23.   We also discussed his sleep apnea and given that his index was greater than 90 and he is morbidly obese recommend evaluation for obesity hypoventilation syndrome and requested full pulmonary function assessment with arterial blood gas on room air. He follows with Dr. Donald Moreland - CPAP is at 15 cm H20. As for this abnormal adrenal gland lesion and MRI for adenoma protocol likely will be enough to eliminate further diagnostic testing. We hope this updates you on our evaluation and clinical thinking. Thank you for entrusting us to participate in [facility]. If you have any questions, please don't hesitate to call us at the [employer].        Sincerely,    Yelena Hernandez D.O., MPH, [facility]  Professor of Internal Medicine  Director of [employer]

## 2018-08-13 NOTE — PROGRESS NOTES
Patient to follow up with physician in. Patient given samples of trelegy during office visit under the direction of Dr. Melisa Mcnally. Patient will be ordered an open MRI of abd for adrenal gland eval.  CT Chest without was ordered for a lung nodule.

## 2018-08-30 ENCOUNTER — HOSPITAL ENCOUNTER (OUTPATIENT)
Dept: CT IMAGING | Age: 48
Discharge: HOME | End: 2018-09-01
Payer: COMMERCIAL

## 2018-08-30 ENCOUNTER — HOSPITAL ENCOUNTER (OUTPATIENT)
Age: 48
Discharge: HOME | End: 2018-08-30
Payer: COMMERCIAL

## 2018-08-30 ENCOUNTER — HOSPITAL ENCOUNTER (OUTPATIENT)
Dept: PULMONOLOGY | Age: 48
Discharge: HOME | End: 2018-08-30
Payer: COMMERCIAL

## 2018-08-30 DIAGNOSIS — E66.01 MORBID OBESITY (HCC): ICD-10-CM

## 2018-08-30 DIAGNOSIS — E27.9 LESION OF ADRENAL GLAND (HCC): Primary | ICD-10-CM

## 2018-08-30 DIAGNOSIS — E27.9 LESION OF ADRENAL GLAND (HCC): ICD-10-CM

## 2018-08-30 DIAGNOSIS — G47.33 OBSTRUCTIVE SLEEP APNEA SYNDROME: ICD-10-CM

## 2018-08-30 DIAGNOSIS — J45.30 MILD PERSISTENT REACTIVE AIRWAY DISEASE WITHOUT COMPLICATION: ICD-10-CM

## 2018-08-30 DIAGNOSIS — R91.1 PULMONARY NODULE: ICD-10-CM

## 2018-08-30 LAB — PROSTATE SPECIFIC ANTIGEN: 0.54 NG/ML (ref 0–4)

## 2018-08-30 PROCEDURE — 94726 PLETHYSMOGRAPHY LUNG VOLUMES: CPT | Performed by: INTERNAL MEDICINE

## 2018-08-30 PROCEDURE — 94060 EVALUATION OF WHEEZING: CPT | Performed by: INTERNAL MEDICINE

## 2018-08-30 PROCEDURE — 94726 PLETHYSMOGRAPHY LUNG VOLUMES: CPT

## 2018-08-30 PROCEDURE — 36415 COLL VENOUS BLD VENIPUNCTURE: CPT

## 2018-08-30 PROCEDURE — 71250 CT THORAX DX C-: CPT

## 2018-08-30 PROCEDURE — 94729 DIFFUSING CAPACITY: CPT | Performed by: INTERNAL MEDICINE

## 2018-08-30 PROCEDURE — 94729 DIFFUSING CAPACITY: CPT

## 2018-08-30 PROCEDURE — G0103 PSA SCREENING: HCPCS

## 2018-08-30 PROCEDURE — 94375 RESPIRATORY FLOW VOLUME LOOP: CPT

## 2018-09-13 ENCOUNTER — HOSPITAL ENCOUNTER (OUTPATIENT)
Dept: MRI IMAGING | Age: 48
Discharge: HOME | End: 2018-09-15
Payer: COMMERCIAL

## 2018-09-13 DIAGNOSIS — E27.9 LESION OF ADRENAL GLAND (HCC): ICD-10-CM

## 2018-09-13 LAB
BUN BLDV-MCNC: 13 MG/DL (ref 6–20)
CREAT SERPL-MCNC: 1.1 MG/DL (ref 0.7–1.2)
GFR AFRICAN AMERICAN: >60
GFR NON-AFRICAN AMERICAN: >60 ML/MIN/1.73

## 2018-09-13 PROCEDURE — 82565 ASSAY OF CREATININE: CPT

## 2018-09-13 PROCEDURE — A9579 GAD-BASE MR CONTRAST NOS,1ML: HCPCS | Performed by: RADIOLOGY

## 2018-09-13 PROCEDURE — 84520 ASSAY OF UREA NITROGEN: CPT

## 2018-09-13 PROCEDURE — 36415 COLL VENOUS BLD VENIPUNCTURE: CPT

## 2018-09-13 PROCEDURE — 6360000004 HC RX CONTRAST MEDICATION: Performed by: RADIOLOGY

## 2018-09-13 PROCEDURE — 74183 MRI ABD W/O CNTR FLWD CNTR: CPT

## 2018-09-13 RX ADMIN — GADOTERIDOL 20 ML: 279.3 INJECTION, SOLUTION INTRAVENOUS at 15:17

## 2018-09-25 ENCOUNTER — OFFICE VISIT (OUTPATIENT)
Dept: PULMONOLOGY | Age: 48
End: 2018-09-25
Payer: COMMERCIAL

## 2018-09-25 VITALS
HEART RATE: 103 BPM | DIASTOLIC BLOOD PRESSURE: 70 MMHG | WEIGHT: 315 LBS | OXYGEN SATURATION: 96 % | SYSTOLIC BLOOD PRESSURE: 114 MMHG | BODY MASS INDEX: 45.1 KG/M2 | HEIGHT: 70 IN | RESPIRATION RATE: 16 BRPM | TEMPERATURE: 98.3 F

## 2018-09-25 DIAGNOSIS — J41.1 MUCOPURULENT CHRONIC BRONCHITIS (HCC): ICD-10-CM

## 2018-09-25 DIAGNOSIS — J45.909 REACTIVE AIRWAY DISEASE WITHOUT COMPLICATION, UNSPECIFIED ASTHMA SEVERITY, UNSPECIFIED WHETHER PERSISTENT: ICD-10-CM

## 2018-09-25 DIAGNOSIS — E66.01 MORBID OBESITY (HCC): ICD-10-CM

## 2018-09-25 DIAGNOSIS — R91.1 PULMONARY NODULE: ICD-10-CM

## 2018-09-25 DIAGNOSIS — G47.33 OBSTRUCTIVE SLEEP APNEA SYNDROME: Primary | ICD-10-CM

## 2018-09-25 LAB
DLCO %PRED: NORMAL
DLCO/VA %PRED: NORMAL
DLCO: NORMAL ML/MMHG SEC
FEF 25-75% PRE PRED: NORMAL
FEF 25-75%-PRE: 3.63
FEV1 %PRED-PRE: NORMAL
FEV1/FVC PRED: NORMAL
FEV1/FVC: 82 %
FEV1: 3.72 LITERS
FEV3 PRE: 4.37
FVC %PRED-PRE: NORMAL
FVC: 4.55 LITERS
MEP: NORMAL
MIP: NORMAL
PEF %PRED-PRE: NORMAL
PEF-PRE: NORMAL L/SEC
TLC %PRED: NORMAL
TLC: NORMAL LITERS

## 2018-09-25 PROCEDURE — 3023F SPIROM DOC REV: CPT | Performed by: INTERNAL MEDICINE

## 2018-09-25 PROCEDURE — G8417 CALC BMI ABV UP PARAM F/U: HCPCS | Performed by: INTERNAL MEDICINE

## 2018-09-25 PROCEDURE — G8925 SPIR FEV1/FVC>=60% & NO COPD: HCPCS | Performed by: INTERNAL MEDICINE

## 2018-09-25 PROCEDURE — G8427 DOCREV CUR MEDS BY ELIG CLIN: HCPCS | Performed by: INTERNAL MEDICINE

## 2018-09-25 PROCEDURE — 99213 OFFICE O/P EST LOW 20 MIN: CPT | Performed by: INTERNAL MEDICINE

## 2018-09-25 PROCEDURE — 1036F TOBACCO NON-USER: CPT | Performed by: INTERNAL MEDICINE

## 2018-09-25 PROCEDURE — 94010 BREATHING CAPACITY TEST: CPT | Performed by: INTERNAL MEDICINE

## 2018-09-25 PROCEDURE — 99214 OFFICE O/P EST MOD 30 MIN: CPT | Performed by: INTERNAL MEDICINE

## 2018-09-25 ASSESSMENT — PULMONARY FUNCTION TESTS
FEV1/FVC: 82
FVC: 4.55
FEV1: 3.72

## 2018-09-25 NOTE — PROGRESS NOTES
Patient to follow up with physician in 6 months  . Patient given samples of trelegy during office visit under the direction of Dr. Deonte Ayala. CT Scan in one year.

## 2018-09-25 NOTE — PROGRESS NOTES
Pulmonary [address] [facility]    Department of Internal Medicine  Division of Pulmonary, Critical Care and Sleep Medicine      Dear Pawel Hong MD    We had the pleasure of seeing Garret Ann, in the [address] at [facility] regarding his lung nodule, sleep apnea, cough consistent with chronic bronchitis and morbid obesity. Severe CORAZON on CPAP    HISTORY OF PRESENT ILLNESS:    Garret Ann is a 49 y.o. male same morbidly obese -American gentleman who presents for evaluation of a cough that has been persistent for about 1 year. States the cough recently got worse with green mucus production over the past few months. He is a one pack per week smoker and is been smoking more since having difficulty with regular employment. 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 There is no extensive family history of chronic obstructive pulmonary disease noted. He did not have asthma as a child. He has no known allergies except for nonsteroidal's. For the cough he was placed on albuterol and a chest x-ray was performed showing a right mid lung field pulmonary nodule. With the above issues pulmonary consultation was requested. In addition to the above he has known sleep apnea. He has an index of greater than 90 with nocturnal hypoxemia in 2014 I recommended CPAP at 9 cm of water pressure he currently states he is on 15 cm of water pressure and is followed by Dr. Juan Francisco Blanco in his sleep laboratory. Significant weight gain has been noted over the past 6 months. Strong family history of breast cancer noted. On today's visit, Garret Ann is doing well. We followed and discussed his diagnostic testing. His PSA was normal. Lung nodule is stable and He PFTs have normalized. He quit smoking. He is on Trelegy once a day. The MR for the adrenal lesion report was not adequate - I left a message with radiologist.  He denies any chest pain.   He has no disorder. Integumentory: No skin lesion, rashes, venous stasis or varicose veins. They denies any report of skin cancer. Rheumatic:  The patient denies polyarthritis or inflammatory joint disease. PHYSICAL EXAMINATION:   Vitals:    09/25/18 1359   BP: 114/70   Pulse: 103   Resp: 16   Temp: 98.3 °F (36.8 °C)   TempSrc: Oral   SpO2: 96%   Weight: (!) 345 lb (156.5 kg)   Height: 5' 10\" (1.778 m)     Constitutional: A morbidly obese 49 y.o. male who is alert, oriented, cooperative and in no apparent distress. Head was normocephalic and atraumatic. EENT: EOMI DWAYNE. MMM. No icterus. No conjunctival injections. External canals are patent and no discharge was appreciated. Septum was midline, mucosa was without erythema, exudates or cobblestoning. No thrush. Neck: Supple without thyromegaly. No elevated JVP. Trachea was midline. No carotid bruits. Respiratory: Symmetrical without dullness to percussion. Essential clear. No rhonchi or rales. No intercostal retraction or use of accessory muscles. No egophony. Cardiovascular: Nonpalpable PMI. Regular without murmur, clicks, gallops or rubs. No left or right ventricular heave. Pulses:  Carotid, radial and femoral pulses were equal bilaterally. Abdomen: Obese, rounded and soft without organomegaly. No rebound, rigidity. Lymphatic: No lymphadenopathy. Musculoskeletal: Ambulates without assistance. Normal curvature of the spine. No gross muscle weakness. Muscle size, tone and strength are normal. No involuntary movements. Extremities:  No lower extremity edema. Coordination appears adequate. Sensory function appears intact. Deep tendon reflexes are normal.   Skin:  Warm and dry. Examination of color, turgor and pigmentation was relatively normal. No bruises or skin rashes. Old surgical scars are noted.    Neurological/Psychiatric: General behavior, level of consciousness, thought content is normal. The patient's emotional status is pneumothorax or pleural effusion is identified. Left lun. No noncalcified pulmonary nodule, spiculated mass, pleural effusion, or pneumothorax is identified. No supraclavicular, axillary, mediastinal lymphadenopathy. Hilar lymphadenopathy is difficult to ascertain given the lack of IV contrast administration. . Visualized portions of the thyroid, heart, esophagus, stomach, gallbladder, liver, left adrenals, spleen, pancreas, pancreatic duct, common bile duct and visualized kidneys are unremarkable. Vertebral body heights and intervertebral disc spaces are well-preserved. There is normal sagittal alignment of the visualized spine. No lytic or blastic bony lesions. . Nodular abnormality projecting from the right adrenal gland, demonstrating Hounsfield unit on average characterization of 8.2 on average. Lesion measures approximately 1.9 cm longitudinal by 1.2 cm transverse. Hemoglobin/Hematocrit:    Lab Results   Component Value Date    HGB 14.1 2018    HCT 44.0 2018     BUN/Creatinine:    Lab Results   Component Value Date    BUN 13 2018    CREATININE 1.1 2018          IMPRESSION:      Aaron Rosario is a 49 y.o. male with descriptive evidence of chronic bronchitis who quit smoking in 2018 with a abnormal x-ray revealing a right middle lung field nodule which prompted a CT scan of the chest revealing small sub-centimeter nodules and adrenal adenoma. With this in mind, we would like to proceed with the following;                      PLAN:   He has chronic asthmatic bronchitis and remain with normal pulmonary function tests on [facility]. In 2018 his CT scan shows some's subtle changes such as signet rings and increasing bronchial thickening. Repeat CT scan is stable nodule at 3 mm in size. His PSA screen - was normal. We discussed weight loss with him and recommended dietary counseling.  Smoking sensation was discussed and a referral to her smoking sensation program - he quit a in

## 2019-03-08 LAB
ALBUMIN SERPL-MCNC: NORMAL G/DL
ALP BLD-CCNC: NORMAL U/L
ALT SERPL-CCNC: NORMAL U/L
ANION GAP SERPL CALCULATED.3IONS-SCNC: NORMAL MMOL/L
AST SERPL-CCNC: NORMAL U/L
AVERAGE GLUCOSE: NORMAL
BILIRUB SERPL-MCNC: NORMAL MG/DL
BUN BLDV-MCNC: NORMAL MG/DL
CALCIUM SERPL-MCNC: NORMAL MG/DL
CHLORIDE BLD-SCNC: NORMAL MMOL/L
CHOLESTEROL, TOTAL: NORMAL
CHOLESTEROL/HDL RATIO: NORMAL
CO2: NORMAL
CREAT SERPL-MCNC: NORMAL MG/DL
GFR CALCULATED: NORMAL
GLUCOSE BLD-MCNC: NORMAL MG/DL
HBA1C MFR BLD: 6.2 %
HDLC SERPL-MCNC: NORMAL MG/DL
LDL CHOLESTEROL CALCULATED: NORMAL
NONHDLC SERPL-MCNC: NORMAL MG/DL
POTASSIUM SERPL-SCNC: NORMAL MMOL/L
SODIUM BLD-SCNC: NORMAL MMOL/L
TOTAL PROTEIN: NORMAL
TRIGL SERPL-MCNC: NORMAL MG/DL
VLDLC SERPL CALC-MCNC: NORMAL MG/DL

## 2019-03-26 ENCOUNTER — OFFICE VISIT (OUTPATIENT)
Dept: PULMONOLOGY | Age: 49
End: 2019-03-26

## 2019-03-26 VITALS
HEART RATE: 93 BPM | BODY MASS INDEX: 45.1 KG/M2 | OXYGEN SATURATION: 96 % | RESPIRATION RATE: 18 BRPM | DIASTOLIC BLOOD PRESSURE: 59 MMHG | HEIGHT: 70 IN | SYSTOLIC BLOOD PRESSURE: 125 MMHG | WEIGHT: 315 LBS

## 2019-03-26 DIAGNOSIS — G47.33 OBSTRUCTIVE SLEEP APNEA SYNDROME: Primary | ICD-10-CM

## 2019-03-26 DIAGNOSIS — J41.1 MUCOPURULENT CHRONIC BRONCHITIS (HCC): ICD-10-CM

## 2019-03-26 DIAGNOSIS — R91.1 PULMONARY NODULE: ICD-10-CM

## 2019-03-26 DIAGNOSIS — J45.909 REACTIVE AIRWAY DISEASE WITHOUT COMPLICATION, UNSPECIFIED ASTHMA SEVERITY, UNSPECIFIED WHETHER PERSISTENT: ICD-10-CM

## 2019-03-26 LAB
EXPIRATORY TIME: NORMAL SEC
FEF 25-75% %PRED-PRE: NORMAL L/SEC
FEF 25-75% PRED: NORMAL L/SEC
FEF 25-75%-PRE: NORMAL L/SEC
FEV1 %PRED-PRE: 101 %
FEV1 PRED: 3.37 L
FEV1/FVC %PRED-PRE: 101 %
FEV1/FVC PRED: 80 %
FEV1/FVC: 81 %
FEV1: 3.4 L
FVC %PRED-PRE: 99 %
FVC PRED: 4.23 L
FVC: 4.2 L
PEF %PRED-PRE: NORMAL L/SEC
PEF PRED: NORMAL L/SEC
PEF-PRE: NORMAL L/SEC

## 2019-03-26 PROCEDURE — 94010 BREATHING CAPACITY TEST: CPT | Performed by: INTERNAL MEDICINE

## 2019-03-26 PROCEDURE — 99213 OFFICE O/P EST LOW 20 MIN: CPT | Performed by: INTERNAL MEDICINE

## 2019-03-26 RX ORDER — AZITHROMYCIN 250 MG/1
250 TABLET, FILM COATED ORAL DAILY
Qty: 10 TABLET | Refills: 0 | Status: SHIPPED | OUTPATIENT
Start: 2019-03-26 | End: 2019-04-05

## 2019-03-26 ASSESSMENT — PULMONARY FUNCTION TESTS
FEV1_PERCENT_PREDICTED_PRE: 101
FEV1_PREDICTED: 3.37
FEV1/FVC_PERCENT_PREDICTED_PRE: 101
FVC_PREDICTED: 4.23
FEV1: 3.40
FEV1/FVC: 81
FVC: 4.20
FVC_PERCENT_PREDICTED_PRE: 99
FEV1/FVC_PREDICTED: 80

## 2019-10-15 ENCOUNTER — HOSPITAL ENCOUNTER (OUTPATIENT)
Age: 49
Discharge: HOME | End: 2019-10-15

## 2019-10-15 ENCOUNTER — OFFICE VISIT (OUTPATIENT)
Dept: PULMONOLOGY | Age: 49
End: 2019-10-15

## 2019-10-15 VITALS
RESPIRATION RATE: 18 BRPM | OXYGEN SATURATION: 94 % | DIASTOLIC BLOOD PRESSURE: 64 MMHG | SYSTOLIC BLOOD PRESSURE: 129 MMHG | HEIGHT: 71 IN | HEART RATE: 87 BPM | BODY MASS INDEX: 44.1 KG/M2 | WEIGHT: 315 LBS

## 2019-10-15 DIAGNOSIS — R91.1 PULMONARY NODULE: ICD-10-CM

## 2019-10-15 DIAGNOSIS — J45.41 MODERATE PERSISTENT ASTHMATIC BRONCHITIS WITH ACUTE EXACERBATION: ICD-10-CM

## 2019-10-15 DIAGNOSIS — J41.1 MUCOPURULENT CHRONIC BRONCHITIS (HCC): ICD-10-CM

## 2019-10-15 DIAGNOSIS — G47.33 OBSTRUCTIVE SLEEP APNEA SYNDROME: ICD-10-CM

## 2019-10-15 DIAGNOSIS — G47.33 OBSTRUCTIVE SLEEP APNEA SYNDROME: Primary | ICD-10-CM

## 2019-10-15 LAB
BASOPHILS ABSOLUTE: 0.03 E9/L (ref 0–0.2)
BASOPHILS RELATIVE PERCENT: 0.3 % (ref 0–2)
EOSINOPHILS ABSOLUTE: 0.69 E9/L (ref 0.05–0.5)
EOSINOPHILS RELATIVE PERCENT: 8 % (ref 0–6)
EXPIRATORY TIME: NORMAL SEC
FEF 25-75% %PRED-PRE: NORMAL L/SEC
FEF 25-75% PRED: NORMAL L/SEC
FEF 25-75%-PRE: NORMAL L/SEC
FEV1 %PRED-PRE: 72 %
FEV1 PRED: 3.46 L
FEV1/FVC %PRED-PRE: 93 %
FEV1/FVC PRED: 80 %
FEV1/FVC: 75 %
FEV1: 2.52 L
FVC %PRED-PRE: 77 %
FVC PRED: 4.36 L
FVC: 3.36 L
HCT VFR BLD CALC: 47.6 % (ref 37–54)
HEMOGLOBIN: 14.7 G/DL (ref 12.5–16.5)
IMMATURE GRANULOCYTES #: 0.02 E9/L
IMMATURE GRANULOCYTES %: 0.2 % (ref 0–5)
LYMPHOCYTES ABSOLUTE: 3.68 E9/L (ref 1.5–4)
LYMPHOCYTES RELATIVE PERCENT: 42.6 % (ref 20–42)
MCH RBC QN AUTO: 27.8 PG (ref 26–35)
MCHC RBC AUTO-ENTMCNC: 30.9 % (ref 32–34.5)
MCV RBC AUTO: 90 FL (ref 80–99.9)
MONOCYTES ABSOLUTE: 0.55 E9/L (ref 0.1–0.95)
MONOCYTES RELATIVE PERCENT: 6.4 % (ref 2–12)
NEUTROPHILS ABSOLUTE: 3.67 E9/L (ref 1.8–7.3)
NEUTROPHILS RELATIVE PERCENT: 42.5 % (ref 43–80)
PDW BLD-RTO: 16.7 FL (ref 11.5–15)
PEF %PRED-PRE: NORMAL L/SEC
PEF PRED: NORMAL L/SEC
PEF-PRE: NORMAL L/SEC
PLATELET # BLD: 259 E9/L (ref 130–450)
PMV BLD AUTO: 9.8 FL (ref 7–12)
RBC # BLD: 5.29 E12/L (ref 3.8–5.8)
WBC # BLD: 8.6 E9/L (ref 4.5–11.5)

## 2019-10-15 PROCEDURE — 82785 ASSAY OF IGE: CPT

## 2019-10-15 PROCEDURE — 36415 COLL VENOUS BLD VENIPUNCTURE: CPT

## 2019-10-15 PROCEDURE — 99213 OFFICE O/P EST LOW 20 MIN: CPT | Performed by: INTERNAL MEDICINE

## 2019-10-15 PROCEDURE — 86003 ALLG SPEC IGE CRUDE XTRC EA: CPT

## 2019-10-15 PROCEDURE — 94010 BREATHING CAPACITY TEST: CPT | Performed by: INTERNAL MEDICINE

## 2019-10-15 PROCEDURE — 86255 FLUORESCENT ANTIBODY SCREEN: CPT

## 2019-10-15 PROCEDURE — 85025 COMPLETE CBC W/AUTO DIFF WBC: CPT

## 2019-10-15 PROCEDURE — 99215 OFFICE O/P EST HI 40 MIN: CPT | Performed by: INTERNAL MEDICINE

## 2019-10-15 RX ORDER — ALBUTEROL SULFATE 0.63 MG/3ML
1 SOLUTION RESPIRATORY (INHALATION) EVERY 4 HOURS PRN
Qty: 270 ML | Refills: 6 | Status: SHIPPED | OUTPATIENT
Start: 2019-10-15 | End: 2019-11-14

## 2019-10-15 RX ORDER — PREDNISONE 10 MG/1
10 TABLET ORAL DAILY
Qty: 30 TABLET | Refills: 0 | Status: SHIPPED | OUTPATIENT
Start: 2019-10-15 | End: 2019-11-14

## 2019-10-15 RX ORDER — MONTELUKAST SODIUM 10 MG/1
10 TABLET ORAL NIGHTLY
Qty: 90 TABLET | Refills: 3 | Status: SHIPPED | OUTPATIENT
Start: 2019-10-15 | End: 2020-02-03

## 2019-10-15 RX ORDER — FEXOFENADINE HCL AND PSEUDOEPHEDRINE HCI 180; 240 MG/1; MG/1
1 TABLET, EXTENDED RELEASE ORAL DAILY
Qty: 90 TABLET | Refills: 1 | Status: SHIPPED
Start: 2019-10-15 | End: 2021-06-24

## 2019-10-15 ASSESSMENT — PULMONARY FUNCTION TESTS
FVC_PERCENT_PREDICTED_PRE: 77
FEV1: 2.52
FEV1/FVC: 75
FVC: 3.36
FEV1/FVC_PERCENT_PREDICTED_PRE: 93
FEV1/FVC_PREDICTED: 80
FEV1_PERCENT_PREDICTED_PRE: 72
FVC_PREDICTED: 4.36
FEV1_PREDICTED: 3.46

## 2019-10-18 LAB — ANCA IFA: NORMAL

## 2019-10-20 LAB
Lab: NORMAL
REPORT: NORMAL
THIS TEST SENT TO: NORMAL

## 2019-10-23 DIAGNOSIS — J82.83 EOSINOPHILIC ASTHMA: ICD-10-CM

## 2019-10-23 RX ORDER — DIPHENHYDRAMINE HYDROCHLORIDE 50 MG/ML
50 INJECTION INTRAMUSCULAR; INTRAVENOUS ONCE
Status: CANCELLED | OUTPATIENT
Start: 2019-10-23

## 2019-10-23 RX ORDER — METHYLPREDNISOLONE SODIUM SUCCINATE 125 MG/2ML
125 INJECTION, POWDER, LYOPHILIZED, FOR SOLUTION INTRAMUSCULAR; INTRAVENOUS ONCE
Status: CANCELLED | OUTPATIENT
Start: 2019-10-23

## 2019-10-23 RX ORDER — EPINEPHRINE 1 MG/ML
0.3 INJECTION, SOLUTION, CONCENTRATE INTRAVENOUS PRN
Status: CANCELLED | OUTPATIENT
Start: 2019-10-23

## 2019-10-23 RX ORDER — SODIUM CHLORIDE 9 MG/ML
INJECTION, SOLUTION INTRAVENOUS CONTINUOUS
Status: CANCELLED | OUTPATIENT
Start: 2019-10-23

## 2020-02-03 ENCOUNTER — OFFICE VISIT (OUTPATIENT)
Dept: PULMONOLOGY | Age: 50
End: 2020-02-03
Payer: COMMERCIAL

## 2020-02-03 VITALS
HEIGHT: 71 IN | BODY MASS INDEX: 44.1 KG/M2 | SYSTOLIC BLOOD PRESSURE: 144 MMHG | RESPIRATION RATE: 20 BRPM | HEART RATE: 98 BPM | TEMPERATURE: 98 F | OXYGEN SATURATION: 92 % | WEIGHT: 315 LBS | DIASTOLIC BLOOD PRESSURE: 89 MMHG

## 2020-02-03 LAB
EXPIRATORY TIME: 8.64 SEC
FEF 25-75% %PRED-PRE: 74 L/SEC
FEF 25-75% PRED: 3.18 L/SEC
FEF 25-75%-PRE: 2.37 L/SEC
FEV1 %PRED-PRE: 86 %
FEV1 PRED: 3.46 L
FEV1/FVC %PRED-PRE: 94 %
FEV1/FVC PRED: 80 %
FEV1/FVC: 76 %
FEV1: 3.01 L
FVC %PRED-PRE: 90 %
FVC PRED: 4.36 L
FVC: 3.96 L
PARTS PER BILLION: 41
PEF %PRED-PRE: 87 L/SEC
PEF PRED: 9.06 L/SEC
PEF-PRE: 7.92 L/SEC

## 2020-02-03 PROCEDURE — 99214 OFFICE O/P EST MOD 30 MIN: CPT | Performed by: INTERNAL MEDICINE

## 2020-02-03 PROCEDURE — 94010 BREATHING CAPACITY TEST: CPT | Performed by: INTERNAL MEDICINE

## 2020-02-03 PROCEDURE — 99213 OFFICE O/P EST LOW 20 MIN: CPT | Performed by: INTERNAL MEDICINE

## 2020-02-03 RX ORDER — GUAIFENESIN 600 MG/1
1200 TABLET, EXTENDED RELEASE ORAL 2 TIMES DAILY PRN
COMMUNITY
Start: 2020-02-03 | End: 2021-06-24

## 2020-02-03 RX ORDER — MONTELUKAST SODIUM 10 MG/1
10 TABLET ORAL NIGHTLY
Qty: 30 TABLET | Refills: 12 | Status: SHIPPED
Start: 2020-02-03 | End: 2021-09-30 | Stop reason: SDUPTHER

## 2020-02-03 ASSESSMENT — PULMONARY FUNCTION TESTS
FEV1: 3.01
FEV1/FVC_PREDICTED: 80
FEV1/FVC: 76
FEV1/FVC_PERCENT_PREDICTED_PRE: 94
FEV1_PREDICTED: 3.46
FEV1_PERCENT_PREDICTED_PRE: 86
FVC: 3.96
FVC_PREDICTED: 4.36
FVC_PERCENT_PREDICTED_PRE: 90

## 2020-02-03 NOTE — PROGRESS NOTES
[address] [facility]  Department of Internal Medicine  Division of Pulmonary, Critical Care and Sleep Medicine  Office Note    Dear Waylon Hopper MD    We had the pleasure of seeing Gertrude Schroeder, in the [address] at [facility] regarding his lung nodule, sleep apnea, cough consistent with chronic bronchitis and morbid obesity. Severe CORAZON on CPAP    HISTORY OF PRESENT ILLNESS:    Gertrude Schroeder is a 47 y.o. male same morbidly obese -American gentleman who presents for evaluation of a cough that has been persistent for about 1 year. States the cough recently got worse with green mucus production over the past few months. He is a one pack per week smoker and is been smoking more since having difficulty with regular employment. 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 There is no extensive family history of chronic obstructive pulmonary disease noted. He did not have asthma as a child. He has no known allergies except for nonsteroidal's. For the cough he was placed on albuterol and a chest x-ray was performed showing a right mid lung field pulmonary nodule. With the above issues pulmonary consultation was requested. In addition to the above he has known sleep apnea. He has an index of greater than 90 with nocturnal hypoxemia in 2014 I recommended CPAP at 9 cm of water pressure he currently states he is on 15 cm of water pressure and is followed by Dr. Mortimer Stain in his sleep laboratory. Significant weight gain has been noted over the past 6 months. Strong family history of breast cancer noted. His PSA was normal.    On today's visit, Gertrude Schroeder is again dealing with acute UIR with wheezing and asthma exacerbation. We discussed/talked that this is his third episode requiring steroids (systemic).  We have thus decided to start a secondary asthma work up and will start him on mouth daily      Cholecalciferol (VITAMIN D3) 29596 units CAPS Take 1 capsule by mouth daily      famotidine (PEPCID) 20 MG tablet Take 20 mg by mouth 2 times daily.  hydrochlorothiazide (HYDRODIURIL) 25 MG tablet Take 25 mg by mouth daily.  verapamil (CALAN) 120 MG tablet Take 120 mg by mouth once.  famotidine (PEPCID) 20 MG tablet Take 1 tablet by mouth 2 times daily for 7 days. 14 tablet 0     No current facility-administered medications for this visit. SOCIAL AND OCCUPATIONAL HEALTH:  The patient smokes 1 pack every two weeks. There is no history of TB or TB exposure. There is no asbestos or silica dust exposure. The patient reports no coal, foundry, quarry or [employer] exposure. There is no recent travel history noted. The patient denies a history of recreational or IV drug use. No hot tub exposure. The patient has no pets. The patient denies excessive alcohol intake. He is a  by trade. 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 He smokes because he is under financial stress being on and off work. His wife works at a truck stop. He is  with 2 children. SOCIAL HISTORY: Age-appropriate past and current activities are:  Social History     Tobacco Use    Smoking status: Never Smoker    Smokeless tobacco: Never Used   Substance Use Topics    Alcohol use: No    Drug use: No       SURGICAL HISTORY:   Past Surgical History:   Procedure Laterality Date    CYST REMOVAL      HERNIA REPAIR         FAMILY HISTORY: A review of medical events in the patient's family , including disease which may be hereditary or place the patient at risk were reviewed. He does not know his father. His mother  from cancer at age 48 with metastatic disease. He has 2 sisters older one with shortness of breath issues and diabetes. REVIEW OF SYSTEMS: The patients health assessment form was reviewed. Constitutional: General health fair .  The patient complains of weight changes. The patient denies any recent fevers or weakness. Head: Patient denies any history of trauma, convulsive disorder or syncope. Skin:  Patient denies history of changes in pigmentation, eruptions or pruritus. Eyes: Patient denies any history of color blindness, photophobia, diplopia, inflammation,. He  denies cataracts. Denies glaucoma. Ears: Patient denies history of deafness, tinnitus, pain, discharge or recurrent infections. Nose/Throat: Patient denies history of rhinitis, chronic nasal discharge, drainage, epistaxis, obstruction or nasal polyps. Patient denies history of soreness of mouth or tongue. Patient denies history of hoarseness, voice changes, sore throats or recurrent tonsillitis. Dental surgery with extraction and perioral abscess of the neck requiring drainage. Lymphatic:  Patient denies history of enlargement, inflammation, pain or suppuration. He denies history of lymphoma. Cardiovascular:  Patient denies history of palpations, heart murmur, irregular rhythm, chest pain. He denies orthopnea, rheumatic fever, scarlet fever, cold extremities. He denies edema. Normal cholesterol. Pulmonary:  Patient admits to exertional dyspnea, nocturnal dyspnea. He complains of cough. He complains of hemoptysis or pleurisy. He complains of sleep disorder. He reports symptoms of sleep apnea. CPAP 15 cm H20. Gastrointestinal: Patient denies changes in appetite. He denies dysphagia, odynophagia or abdominal pain. He denies hematochezia, melena, bowel habit changes or hemorrhoids. Colonscopy in 2018 (-) Abnormal adrenal gland lesion and MRI for adenoma was negative. Allergy/Immunology: The patient denies itching, hives, or angioedema. The patient admits to a problem with seasonal/environmental allergies. Genitourinary:  The patient denies a history of stones or UTI. Vascular: No history of peripheral vascular disease, carotid occlusive disease or aneurysms. RECOMMENDATIONS:  Start CPAP at 15 cm of water pressure using a medium wide ResMed FX    nasal mask with heat humidification. Counseled on driving or using heavy equipment until the sleep disorder  is treated. CT SCAN CHEST (2018) Impression: Previously seen nodule located adjacent to the oblique fissure within the superior segment of right lower lobe is stable since prior and measures approximately 3 mm. Additional smaller nodule seen along for further aspect of right lung base is also stable. No new nodule or mass. No airspace opacity or evidence of pleural effusion. The heart is normal in size. No pericardial effusion. View of the upper abdomen shows normal bilateral adrenal glands. MRI ABDOMEN: Cholelithiasis. Small indeterminate right adrenal lesion. CT SCAN CHEST [3/2018]: Right lun. Pulmonary nodule (series 3, image 45), measuring approximately 0.37 cm. 2. Pulmonary nodule (series 3, image 35), measuring approximately 0.47 cm. 3. No focal infiltrate/pneumonia, pneumothorax or pleural effusion is identified. Left lun. No noncalcified pulmonary nodule, spiculated mass, pleural effusion, or pneumothorax is identified. No supraclavicular, axillary, mediastinal lymphadenopathy. Hilar lymphadenopathy is difficult to ascertain given the lack of IV contrast administration. Visualized portions of the thyroid, heart, esophagus, stomach, gallbladder, liver, left adrenals, spleen, pancreas, pancreatic duct, common bile duct and visualized kidneys are unremarkable. Vertebral body heights and intervertebral disc spaces are well-preserved. There is normal sagittal alignment of the visualized spine. No lytic or blastic bony lesions. . Nodular abnormality projecting from the right adrenal gland, demonstrating Hounsfield unit on average characterization of 8.2 on average. Lesion measures approximately 1.9 cm longitudinal by 1.2 cm transverse.        IMPRESSION:      Jaime Ferrera is a 47 y.o. male with

## 2020-03-04 ENCOUNTER — TELEPHONE (OUTPATIENT)
Dept: PULMONOLOGY | Age: 50
End: 2020-03-04

## 2020-04-13 ENCOUNTER — TELEPHONE (OUTPATIENT)
Dept: PULMONOLOGY | Age: 50
End: 2020-04-13

## 2020-04-13 RX ORDER — PREDNISONE 10 MG/1
TABLET ORAL
Qty: 30 TABLET | Refills: 0 | Status: SHIPPED
Start: 2020-04-13 | End: 2021-06-24

## 2020-04-13 RX ORDER — LEVOFLOXACIN 750 MG/1
750 TABLET ORAL DAILY
Qty: 10 TABLET | Refills: 0 | Status: SHIPPED | OUTPATIENT
Start: 2020-04-13 | End: 2020-04-23

## 2020-05-14 ENCOUNTER — VIRTUAL VISIT (OUTPATIENT)
Dept: PULMONOLOGY | Age: 50
End: 2020-05-14
Payer: COMMERCIAL

## 2020-05-14 PROCEDURE — 99213 OFFICE O/P EST LOW 20 MIN: CPT | Performed by: INTERNAL MEDICINE

## 2020-05-14 RX ORDER — PREDNISONE 10 MG/1
10 TABLET ORAL DAILY
Qty: 30 TABLET | Refills: 0 | Status: SHIPPED | OUTPATIENT
Start: 2020-05-14 | End: 2020-06-13

## 2020-05-14 NOTE — PROGRESS NOTES
mcg into the lungs daily 1 each 0    albuterol sulfate HFA (PROAIR HFA) 108 (90 Base) MCG/ACT inhaler Inhale 2 puffs into the lungs every 4 hours as needed for Wheezing or Shortness of Breath 1 Inhaler 6    glycopyrrolate-formoterol (BEVESPI) 9-4.8 MCG/ACT AERO Administered as two inhalations taken twice daily in the morning and in the evening by the orally inhaled route only. 1 Inhaler 0    valsartan (DIOVAN) 320 MG tablet Take 320 mg by mouth daily      Cholecalciferol (VITAMIN D3) 13846 units CAPS Take 1 capsule by mouth daily      famotidine (PEPCID) 20 MG tablet Take 20 mg by mouth 2 times daily.  hydrochlorothiazide (HYDRODIURIL) 25 MG tablet Take 25 mg by mouth daily.  verapamil (CALAN) 120 MG tablet Take 120 mg by mouth once.  famotidine (PEPCID) 20 MG tablet Take 1 tablet by mouth 2 times daily for 7 days. 14 tablet 0     No current facility-administered medications for this visit. SOCIAL AND OCCUPATIONAL HEALTH:  The patient smokes 1 pack every two weeks. There is no history of TB or TB exposure. There is no asbestos or silica dust exposure. The patient reports no coal, foundry, quarry or [employer] exposure. There is no recent travel history noted. The patient denies a history of recreational or IV drug use. No hot tub exposure. The patient has no pets. The patient denies excessive alcohol intake. He is a  by trade. 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 He smokes because he is under financial stress being on and off work. His wife works at a truck stop. He is  with 2 children.      SOCIAL HISTORY: Age-appropriate past and current activities are:  Social History     Tobacco Use    Smoking status: Never Smoker    Smokeless tobacco: Never Used   Substance Use Topics    Alcohol use: No    Drug use: No       SURGICAL HISTORY:   Past Surgical History:   Procedure Laterality Date    CYST REMOVAL      HERNIA REPAIR hematochezia, melena, bowel habit changes or hemorrhoids. Colonscopy in 2018 (-) Abnormal adrenal gland lesion and MRI for adenoma was negative. Allergy/Immunology: The patient denies itching, hives, or angioedema. The patient admits to a problem with seasonal/environmental allergies. Genitourinary:  The patient denies a history of stones or UTI. Vascular: No history of peripheral vascular disease, carotid occlusive disease or aneurysms. Musculoskeletal: The patient reports a history of arthritis & joint pains. He denies loss of strength.  + Joint pains. Breasts: He denies history of masses, lumps, pain, or nipple changes. The patient denies a history of breast cancer. Neurological: Patient denies vertigo. He denies twitching, convulsions, loss of consciousness. No memory problems. Psychological: Patient denies moodiness, depression or anxiety. He denies obsessions, delusions, illusions or hallucinations. Cancer: No history of cancer reported. Endocrine: No history of goiter. No history of thyroid disorders. He denies diabetes. Hematopoietic:  He denies history of bleeding disorders or easy bruising. He denies hypercoagulable disorder. Integumentory: No skin lesion, rashes, venous stasis or varicose veins. They denies any report of skin cancer. Rheumatic:  The patient denies polyarthritis or inflammatory joint disease. PHYSICAL EXAMINATION:   There were no vitals filed for this visit. Constitutional: A morbidly obese 47 y.o. male who is alert, oriented, cooperative and in no apparent distress. Head was normocephalic and atraumatic. EENT: EOMI DWAYNE. MMM. No icterus. No conjunctival injections. External canals are patent and no discharge was appreciated. Septum was midline, mucosa was without erythema, exudates or cobblestoning. No thrush. Neck: Supple without thyromegaly. No elevated JVP. Trachea was midline. No carotid bruits.     Respiratory: Symmetrical without dullness to

## 2020-07-08 RX ORDER — LOSARTAN POTASSIUM 100 MG/1
100 TABLET ORAL DAILY
Qty: 90 TABLET | Refills: 3 | Status: SHIPPED
Start: 2020-07-08 | End: 2021-05-10 | Stop reason: SDUPTHER

## 2020-07-31 RX ORDER — HYDROCHLOROTHIAZIDE 25 MG/1
25 TABLET ORAL DAILY
Qty: 30 TABLET | Refills: 3 | Status: SHIPPED
Start: 2020-07-31 | End: 2021-05-10 | Stop reason: SDUPTHER

## 2020-08-18 ENCOUNTER — TELEPHONE (OUTPATIENT)
Dept: PULMONOLOGY | Age: 50
End: 2020-08-18

## 2020-08-18 NOTE — TELEPHONE ENCOUNTER
Patient's message was returned. Patient states that he has been out of his trelegy and nucala since May. Patient states that he has been laid off from his job and lost his insurance. He also states that his wife had a couple of strokes with loss of left side and therefore he has been occupied with her care. An email was sent to Kal Caro from [facility] to inquire of the speciality pharmacy at which the patient gets his Nucala. Awaiting her reply.

## 2020-08-31 ENCOUNTER — TELEPHONE (OUTPATIENT)
Dept: PULMONOLOGY | Age: 50
End: 2020-08-31

## 2020-09-18 VITALS — SYSTOLIC BLOOD PRESSURE: 106 MMHG | BODY MASS INDEX: 49.5 KG/M2 | HEIGHT: 70 IN | DIASTOLIC BLOOD PRESSURE: 67 MMHG

## 2020-09-18 RX ORDER — HYDRALAZINE HYDROCHLORIDE 25 MG/1
25 TABLET, FILM COATED ORAL 2 TIMES DAILY
COMMUNITY
End: 2021-05-10 | Stop reason: SDUPTHER

## 2020-09-18 RX ORDER — DEXAMETHASONE 4 MG/1
2 TABLET ORAL 2 TIMES DAILY WITH MEALS
COMMUNITY
End: 2021-06-24

## 2020-09-28 ENCOUNTER — TELEPHONE (OUTPATIENT)
Dept: PULMONOLOGY | Age: 50
End: 2020-09-28

## 2020-09-28 NOTE — TELEPHONE ENCOUNTER
Patent was requested to call [place] to [place] to give them the proof of income. [phone]. Patient was agreeable.

## 2020-11-12 ENCOUNTER — TELEPHONE (OUTPATIENT)
Dept: PULMONOLOGY | Age: 50
End: 2020-11-12

## 2020-11-12 NOTE — TELEPHONE ENCOUNTER
Pt requesting samples of Trelegy inhaler if available. Per Dr. Deepak Amador sample of Trelegy mailed out to pt.

## 2021-02-01 ENCOUNTER — TELEPHONE (OUTPATIENT)
Dept: PULMONOLOGY | Age: 51
End: 2021-02-01

## 2021-02-01 NOTE — TELEPHONE ENCOUNTER
LMOM informing patient that his appointment for today is being cancelled and rescheduled for 2-26-21 at 2:30 pm as a virtual appointment. Asked to please call us back when he gets his message.

## 2021-02-18 ENCOUNTER — VIRTUAL VISIT (OUTPATIENT)
Dept: PULMONOLOGY | Age: 51
End: 2021-02-18

## 2021-02-18 DIAGNOSIS — G47.33 OSA (OBSTRUCTIVE SLEEP APNEA): Primary | ICD-10-CM

## 2021-02-18 DIAGNOSIS — J41.1 MUCOPURULENT CHRONIC BRONCHITIS (HCC): ICD-10-CM

## 2021-02-18 PROCEDURE — 99442 PR PHYS/QHP TELEPHONE EVALUATION 11-20 MIN: CPT | Performed by: INTERNAL MEDICINE

## 2021-02-18 RX ORDER — MONTELUKAST SODIUM 10 MG/1
10 TABLET ORAL DAILY
Qty: 90 TABLET | Refills: 3 | Status: SHIPPED | OUTPATIENT
Start: 2021-02-18 | End: 2021-02-22

## 2021-02-18 RX ORDER — ALBUTEROL SULFATE 0.63 MG/3ML
1 SOLUTION RESPIRATORY (INHALATION) EVERY 4 HOURS PRN
Qty: 270 ML | Refills: 6 | Status: SHIPPED
Start: 2021-02-18 | End: 2021-06-24

## 2021-02-18 RX ORDER — PREDNISONE 10 MG/1
10 TABLET ORAL DAILY
Qty: 30 TABLET | Refills: 0 | Status: SHIPPED | OUTPATIENT
Start: 2021-02-18 | End: 2021-03-20

## 2021-02-18 RX ORDER — ALBUTEROL SULFATE 90 UG/1
2 AEROSOL, METERED RESPIRATORY (INHALATION) EVERY 4 HOURS PRN
Qty: 3 INHALER | Refills: 4 | Status: SHIPPED | OUTPATIENT
Start: 2021-02-18 | End: 2021-06-24

## 2021-02-18 NOTE — PROGRESS NOTES
[id number] [facility]  Department of Internal Medicine  Division of Pulmonary, Critical Care and Sleep Medicine  Office Note    Dear Chelsey Dee MD    We had the pleasure of seeing Jojo Harris, in the [address] at [facility] regarding his lung nodule, sleep apnea, cough consistent with chronic bronchitis and morbid obesity. Severe CORAZON on CPAP    HISTORY OF PRESENT ILLNESS:    Wenceslao Almonte is a 45 y.o. male same morbidly obese -American gentleman who presents for evaluation of a cough that has been persistent for about 1 year. States the cough recently got worse with green mucus production over the past few months. He is a one pack per week smoker and is been smoking more since having difficulty with regular employment. 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 There is no extensive family history of chronic obstructive pulmonary disease noted. He did not have asthma as a child. He has no known allergies except for nonsteroidal's. For the cough he was placed on albuterol and a chest x-ray was performed showing a right mid lung field pulmonary nodule. With the above issues pulmonary consultation was requested. In addition to the above he has known sleep apnea. He has an index of greater than 90 with nocturnal hypoxemia in 2014 I recommended CPAP at 9 cm of water pressure he currently states he is on 15 cm of water pressure and is followed by Dr. Karen Holloway in his sleep laboratory. Significant weight gain has been noted over the past 6 months. Strong family history of breast cancer noted. His PSA was normal.    On today's phone visit, Jojo Harris is again without his biologic medications for his underlying asthma and is having worsening shortness of breath.   He also has no insurance or medical coverage so he needs help with his prescriptions and other medications. 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 We did get the IL5 biologic approved but he has not taken it cause of issues with the pharmacy. We discussed/talked that this is his third episode requiring steroids (systemic). We have thus decided to start a secondary asthma work up and will start him on a biologic (Breanna Sham). We followed and discussed his diagnostic testing. Lung nodule is stable. He quit smoking. He is on Trelegy once a day - he need samples. He denies any chest pain. He has no pleurisy, hemoptysis or weight loss. He denies any leg swelling. He is using his CPAP at 15 cm of water. We discussed diet and exercise. ALLERGIES:    Allergies   Allergen Reactions    Amlodipine     Naproxen     Penicillins        PAST MEDICAL HISTORY:       Diagnosis Date    Adrenal adenoma     Family history of breast cancer     Hypertension     Morbid obesity ([address]     Mucopurulent chronic bronchitis ([facility] [address] 3/19/2018    Obstructive sleep apnea syndrome 3/19/2018    Pulmonary nodule 3/19/2018    Sleep apnea     Cpap 15 cm BMS    Smoker         MEDICATIONS:   Current Outpatient Medications   Medication Sig Dispense Refill    dexamethasone (DECADRON) 4 MG tablet Take 2 mg by mouth 2 times daily (with meals)      hydrALAZINE (APRESOLINE) 25 MG tablet Take 25 mg by mouth 2 times daily      hydroCHLOROthiazide (HYDRODIURIL) 25 MG tablet Take 1 tablet by mouth daily 30 tablet 3    losartan (COZAAR) 100 MG tablet Take 1 tablet by mouth daily 90 tablet 3    fluticasone-umeclidin-vilant (TRELEGY ELLIPTA) 100-62.5-25 MCG/INH AEPB Inhale 1 puff into the lungs daily 1 each 12    fluticasone-umeclidin-vilant (TRELEGY ELLIPTA) 100-62.5-25 MCG/INH AEPB Inhale 1 puff into the lungs daily 3 each 4    predniSONE (DELTASONE) 10 MG tablet Take 40mg x 3 days, 30mg x 3 days, 20mg x 3 days, 10mg x 3 days then stop.  30 tablet 0    Mepolizumab 100 MG/ML SOAJ Inject 1 pen into the skin every 30 days 1 Syringe 11    guaiFENesin (MUCINEX) 600 MG extended release tablet Take 2 tablets by mouth 2 times daily as needed for Congestion      montelukast (SINGULAIR) 10 MG tablet Take 1 tablet by mouth nightly 30 tablet 12    albuterol (ACCUNEB) 0.63 MG/3ML nebulizer solution Take 3 mLs by nebulization every 4 hours as needed for Wheezing or Shortness of Breath 270 mL 6    fexofenadine-pseudoephedrine (ALLEGRA-D 24HR) 180-240 MG per extended release tablet Take 1 tablet by mouth daily 90 tablet 1    Fluticasone-Umeclidin-Vilant 100-62.5-25 MCG/INH AEPB Inhale 100 mcg into the lungs daily 3 each 4    Fluticasone-Umeclidin-Vilant 100-62.5-25 MCG/INH AEPB Inhale 100 mcg into the lungs daily 1 each 0    albuterol sulfate HFA (PROAIR HFA) 108 (90 Base) MCG/ACT inhaler Inhale 2 puffs into the lungs every 4 hours as needed for Wheezing or Shortness of Breath 1 Inhaler 6    glycopyrrolate-formoterol (BEVESPI) 9-4.8 MCG/ACT AERO Administered as two inhalations taken twice daily in the morning and in the evening by the orally inhaled route only. 1 Inhaler 0    valsartan (DIOVAN) 320 MG tablet Take 320 mg by mouth daily      Cholecalciferol (VITAMIN D3) 28215 units CAPS Take 1 capsule by mouth daily      famotidine (PEPCID) 20 MG tablet Take 20 mg by mouth 2 times daily.  verapamil (CALAN) 120 MG tablet Take 120 mg by mouth once.  famotidine (PEPCID) 20 MG tablet Take 1 tablet by mouth 2 times daily for 7 days. 14 tablet 0     No current facility-administered medications for this visit. SOCIAL AND OCCUPATIONAL HEALTH:  The patient smokes 1 pack every two weeks. There is no history of TB or TB exposure. There is no asbestos or silica dust exposure. The patient reports no coal, foundry, quarry or [employer] exposure. There is no recent travel history noted. The patient denies a history of recreational or IV drug use. No hot tub exposure. The patient has no pets.   The patient denies excessive alcohol intake. He is a  by trade. 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 He smokes because he is under financial stress being on and off work. His wife works at a truck stop. He is  with 2 children. SOCIAL HISTORY: Age-appropriate past and current activities are:  Social History     Tobacco Use    Smoking status: Never Smoker    Smokeless tobacco: Never Used   Substance Use Topics    Alcohol use: No    Drug use: No       SURGICAL HISTORY:   Past Surgical History:   Procedure Laterality Date    CYST REMOVAL      HERNIA REPAIR         FAMILY HISTORY: A review of medical events in the patient's family , including disease which may be hereditary or place the patient at risk were reviewed. He does not know his father. His mother  from cancer at age 48 with metastatic disease. He has 2 sisters older one with shortness of breath issues and diabetes. REVIEW OF SYSTEMS: The patients health assessment form was reviewed. Constitutional: General health fair . The patient complains of weight changes. The patient denies any recent fevers or weakness. Head: Patient denies any history of trauma, convulsive disorder or syncope. Skin:  Patient denies history of changes in pigmentation, eruptions or pruritus. Eyes: Patient denies any history of color blindness, photophobia, diplopia, inflammation,. He  denies cataracts. Denies glaucoma. Ears: Patient denies history of deafness, tinnitus, pain, discharge or recurrent infections. Nose/Throat: Patient denies history of rhinitis, chronic nasal discharge, drainage, epistaxis, obstruction or nasal polyps. Patient denies history of soreness of mouth or tongue. Patient denies history of hoarseness, voice changes, sore throats or recurrent tonsillitis. Dental surgery with extraction and perioral abscess of the neck requiring drainage.    Lymphatic:  Patient denies history of enlargement, inflammation, pain or suppuration. He denies history of lymphoma. Cardiovascular:  Patient denies history of palpations, heart murmur, irregular rhythm, chest pain. He denies orthopnea, rheumatic fever, scarlet fever, cold extremities. He denies edema. Normal cholesterol. Pulmonary:  Patient admits to exertional dyspnea, nocturnal dyspnea. He complains of cough. He complains of hemoptysis or pleurisy. He complains of sleep disorder. He reports symptoms of sleep apnea. CPAP 15 cm H20. Gastrointestinal: Patient denies changes in appetite. He denies dysphagia, odynophagia or abdominal pain. He denies hematochezia, melena, bowel habit changes or hemorrhoids. Colonscopy in 2018 (-) Abnormal adrenal gland lesion and MRI for adenoma was negative. Allergy/Immunology: The patient denies itching, hives, or angioedema. The patient admits to a problem with seasonal/environmental allergies. Genitourinary:  The patient denies a history of stones or UTI. Vascular: No history of peripheral vascular disease, carotid occlusive disease or aneurysms. Musculoskeletal: The patient reports a history of arthritis & joint pains. He denies loss of strength.  + Joint pains. Breasts: He denies history of masses, lumps, pain, or nipple changes. The patient denies a history of breast cancer. Neurological: Patient denies vertigo. He denies twitching, convulsions, loss of consciousness. No memory problems. Psychological: Patient denies moodiness, depression or anxiety. He denies obsessions, delusions, illusions or hallucinations. Cancer: No history of cancer reported. Endocrine: No history of goiter. No history of thyroid disorders. He denies diabetes. Hematopoietic:  He denies history of bleeding disorders or easy bruising. He denies hypercoagulable disorder. Integumentory: No skin lesion, rashes, venous stasis or varicose veins. They denies any report of skin cancer.   Rheumatic:  The patient denies polyarthritis or inflammatory joint disease. PHYSICAL EXAMINATION:   There were no vitals filed for this visit. Constitutional: A morbidly obese 45 y.o. male who is alert, oriented, cooperative and in no apparent distress. Head was normocephalic and atraumatic. EENT: EOMI DWAYNE. MMM. No icterus. No conjunctival injections. External canals are patent and no discharge was appreciated. Septum was midline, mucosa was without erythema, exudates or cobblestoning. No thrush. Neck: Supple without thyromegaly. No elevated JVP. Trachea was midline. No carotid bruits. Respiratory: Symmetrical without dullness to percussion. Wheezing bilaterally. No rhonchi or rales. No intercostal retraction or use of accessory muscles. No egophony. Cardiovascular: Nonpalpable PMI. Regular without murmur, clicks, gallops or rubs. No left or right ventricular heave. Pulses:  Carotid, radial and femoral pulses were equal bilaterally. Abdomen: Obese, rounded and soft without organomegaly. No rebound, rigidity. Lymphatic: No lymphadenopathy. Musculoskeletal: Ambulates without assistance. Normal curvature of the spine. No gross muscle weakness. Muscle size, tone and strength are normal. No involuntary movements. Extremities:  + lower extremity edema. Coordination appears adequate. Sensory function appears intact. Deep tendon reflexes are normal.   Skin:  Warm and dry. Examination of color, turgor and pigmentation was relatively normal. No bruises or skin rashes. Old surgical scars are noted. Neurological/Psychiatric: General behavior, level of consciousness, thought content is normal. The patient's emotional status is normal.  Cranial nerves II-XII are intact. DATA:   The data collected below information that was obtained, reviewed, analyzed and interpreted today.      Spirometry was compared to previous test if available and demonstrates an FVC of 3.96 iters which is 90 % of predicted with an FEV1 of 3.01 liters which is 86% of predicted. FEV1/FVC ratio is 76 %. Mid expiratory flow rates are 49 % of predicted. Maximum voluntary ventilation is 134 liters per minute or 86 % of predicted. Flow volume loop shows no signs of intrathoracic or extrathoracic process. Impression: 20% decline with moderate/severe airflow obstruction     Patients Nitric Oxide level today: 41 ppb   A low Collin (less than 25) in adults implies non eosinophilic airway inflammation or absence of airway inflammation. A high Collin (greater than 50) in adults or a rising Collin with a greater than 40% change from a previously stable level implies uncontrolled or deteriorating eosinophilic airway inflammation. SLEEP STUDY: 2013:  IMPRESSION: Morbid obesity. Hypersomnolence indicated by sleep onset of 8 minutes. Fragmented sleep. Frequent arousals. Severe sleep apnea syndrome with an AHI of 80. No cardiac dysrhythmias. No leg movement disorder. RECOMMENDATIONS:  Start CPAP at 15 cm of water pressure using a medium wide ResMed FX    nasal mask with heat humidification. Counseled on driving or using heavy equipment until the sleep disorder  is treated. CT SCAN CHEST (2018) Impression: Previously seen nodule located adjacent to the oblique fissure within the superior segment of right lower lobe is stable since prior and measures approximately 3 mm. Additional smaller nodule seen along for further aspect of right lung base is also stable. No new nodule or mass. No airspace opacity or evidence of pleural effusion. The heart is normal in size. No pericardial effusion. View of the upper abdomen shows normal bilateral adrenal glands. MRI ABDOMEN: Cholelithiasis. Small indeterminate right adrenal lesion. CT SCAN CHEST [3/2018]: Right lun. Pulmonary nodule (series 3, image 45), measuring approximately 0.37 cm. 2. Pulmonary nodule (series 3, image 35), measuring approximately 0.47 cm.  3. No focal infiltrate/pneumonia, pneumothorax or pleural effusion is identified. Left lun. No noncalcified pulmonary nodule, spiculated mass, pleural effusion, or pneumothorax is identified. No supraclavicular, axillary, mediastinal lymphadenopathy. Hilar lymphadenopathy is difficult to ascertain given the lack of IV contrast administration. Visualized portions of the thyroid, heart, esophagus, stomach, gallbladder, liver, left adrenals, spleen, pancreas, pancreatic duct, common bile duct and visualized kidneys are unremarkable. Vertebral body heights and intervertebral disc spaces are well-preserved. There is normal sagittal alignment of the visualized spine. No lytic or blastic bony lesions. . Nodular abnormality projecting from the right adrenal gland, demonstrating Hounsfield unit on average characterization of 8.2 on average. Lesion measures approximately 1.9 cm longitudinal by 1.2 cm transverse. IMPRESSION:      Haritha Paulino is a 45 y.o. male with descriptive evidence of chronic asthmatic bronchitis who quit smoking in 2018 with a abnormal x-ray revealing a right middle lung field nodule which prompted a CT scan of the chest revealing small sub-centimeter nodules and adrenal adenoma. He has recurrent and frequent exacerbation for his asthma - we are still trying to get him on a biologic. With this in mind, we would like to proceed with the following;                      PLAN:   Anastacio Padilla has as you know chronic asthmatic bronchitis and the repeated events have been worrisome. At this time because of the frequent flairs, we have asked for CBC with diff, ANCA, Ig E and Allergy serum testing to get him started on Biologic (Maria D Thomas). He is to remain on Trelegy. We will try to get home on PAP program. We will start with sample of Nuclea home injections. He is to continue Singulair and nasal wash, with Mucinex. We again request he go on a biologic for his asthma and frequent exacerbations.  We discussed step and will ask the Rep to help get him started. Continued to try to get him his biologic medication to stop the madness of recurrent steroids have been quite frustrating organ to send him samples of the medication for home use and reevaluate where the discrepancy is he is already states that he send in the paperwork to help with no insurance assistance. In the meantime we will send him samples of trilogy and remain compliant with his CPAP I will see him or talk to him in 2 weeks to see how things are going. In March 2018 his CT scan shows some's subtle changes such as signet rings and increasing bronchial thickening. Repeat CT scan is stable nodule at 3 mm in size. His PSA screen - was normal. Today again we discussed weight loss with him and recommended dietary counseling. He quit smoke in March/2018. We also discussed his sleep apnea and given that his index was greater than 90 and he is morbidly obese recommend evaluation for obesity hypoventilation syndrome and requested full pulmonary function assessment with arterial blood gas on room air. He follows with Dr. Vero Salmon - CPAP is at 15 cm H20. We requested new masks and tubing and he is currently comfortable with his pressures. Weight loss discussed. We also recommened cleaning his device with SloClean. We will see him back in 3 months. Flu vaccine needed. We hope this updates you on our evaluation and clinical thinking. Thank you for entrusting us to participate in [facility]la[facility]. If you have any questions, please don't hesitate to call us at the [facility].        Sincerely,    Ellen Kawasaki, D.O., MPH, [facility]  Professor of Internal Medicine  Director of [employer]

## 2021-02-22 RX ORDER — MONTELUKAST SODIUM 10 MG/1
TABLET ORAL
Qty: 30 TABLET | Refills: 5 | Status: SHIPPED
Start: 2021-02-22 | End: 2021-03-05

## 2021-03-05 RX ORDER — MONTELUKAST SODIUM 10 MG/1
TABLET ORAL
Qty: 30 TABLET | Refills: 5 | Status: SHIPPED
Start: 2021-03-05 | End: 2021-06-24

## 2021-03-23 ENCOUNTER — VIRTUAL VISIT (OUTPATIENT)
Dept: PULMONOLOGY | Age: 51
End: 2021-03-23

## 2021-03-23 DIAGNOSIS — J43.9 NOCTURNAL HYPOXEMIA DUE TO EMPHYSEMA (HCC): ICD-10-CM

## 2021-03-23 DIAGNOSIS — G47.36 NOCTURNAL HYPOXEMIA DUE TO EMPHYSEMA (HCC): ICD-10-CM

## 2021-03-23 DIAGNOSIS — G47.33 OSA (OBSTRUCTIVE SLEEP APNEA): Primary | ICD-10-CM

## 2021-03-23 DIAGNOSIS — J45.50 SEVERE PERSISTENT ASTHMA, UNSPECIFIED WHETHER COMPLICATED: ICD-10-CM

## 2021-03-23 NOTE — PROGRESS NOTES
[address] [facility]  Department of Internal Medicine  Division of Pulmonary, Critical Care and Sleep Medicine  Office Note    Dear Abel Kenny MD    We had the pleasure of seeing Christiano Pickard, in the [address] at [facility] regarding his lung nodule, sleep apnea, cough consistent with chronic bronchitis and morbid obesity. Severe CORAZON on CPAP 15 cmH20    HISTORY OF PRESENT ILLNESS:    Wenceslao Li is a 45 y.o. male same morbidly obese -American gentleman who presents for evaluation of a cough that has been persistent for about 1 year. States the cough recently got worse with green mucus production over the past few months. He is a one pack per week smoker and is been smoking more since having difficulty with regular employment. 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 There is no extensive family history of chronic obstructive pulmonary disease noted. He did not have asthma as a child. He has no known allergies except for nonsteroidal's. For the cough he was placed on albuterol and a chest x-ray was performed showing a right mid lung field pulmonary nodule. With the above issues pulmonary consultation was requested. In addition to the above he has known sleep apnea. He has an index of greater than 90 with nocturnal hypoxemia in 2014 I recommended CPAP at 9 cm of water pressure he currently states he is on 15 cm of water pressure and is followed by Dr. Izabela Villegas in his sleep laboratory. Significant weight gain has been noted over the past 6 months. Strong family history of breast cancer noted. His PSA was normal.    On today's phone visit, Christiano Pickard is again without his biologic medications for his underlying asthma and is having worsening shortness of breath.   However finally we were able to get him the financial assistance to receive the medication the puff into the lungs daily 1 each 12    fluticasone-umeclidin-vilant (TRELEGY ELLIPTA) 100-62.5-25 MCG/INH AEPB Inhale 1 puff into the lungs daily 3 each 4    predniSONE (DELTASONE) 10 MG tablet Take 40mg x 3 days, 30mg x 3 days, 20mg x 3 days, 10mg x 3 days then stop. 30 tablet 0    Mepolizumab 100 MG/ML SOAJ Inject 1 pen into the skin every 30 days 1 Syringe 11    guaiFENesin (MUCINEX) 600 MG extended release tablet Take 2 tablets by mouth 2 times daily as needed for Congestion      montelukast (SINGULAIR) 10 MG tablet Take 1 tablet by mouth nightly 30 tablet 12    albuterol (ACCUNEB) 0.63 MG/3ML nebulizer solution Take 3 mLs by nebulization every 4 hours as needed for Wheezing or Shortness of Breath 270 mL 6    fexofenadine-pseudoephedrine (ALLEGRA-D 24HR) 180-240 MG per extended release tablet Take 1 tablet by mouth daily 90 tablet 1    Fluticasone-Umeclidin-Vilant 100-62.5-25 MCG/INH AEPB Inhale 100 mcg into the lungs daily 3 each 4    Fluticasone-Umeclidin-Vilant 100-62.5-25 MCG/INH AEPB Inhale 100 mcg into the lungs daily 1 each 0    albuterol sulfate HFA (PROAIR HFA) 108 (90 Base) MCG/ACT inhaler Inhale 2 puffs into the lungs every 4 hours as needed for Wheezing or Shortness of Breath 1 Inhaler 6    glycopyrrolate-formoterol (BEVESPI) 9-4.8 MCG/ACT AERO Administered as two inhalations taken twice daily in the morning and in the evening by the orally inhaled route only. 1 Inhaler 0    valsartan (DIOVAN) 320 MG tablet Take 320 mg by mouth daily      Cholecalciferol (VITAMIN D3) 91731 units CAPS Take 1 capsule by mouth daily      famotidine (PEPCID) 20 MG tablet Take 20 mg by mouth 2 times daily.  verapamil (CALAN) 120 MG tablet Take 120 mg by mouth once.  famotidine (PEPCID) 20 MG tablet Take 1 tablet by mouth 2 times daily for 7 days. 14 tablet 0     No current facility-administered medications for this visit.         SOCIAL AND OCCUPATIONAL HEALTH:  The patient smokes 1 pack every two epistaxis, obstruction or nasal polyps. Patient denies history of soreness of mouth or tongue. Patient denies history of hoarseness, voice changes, sore throats or recurrent tonsillitis. Dental surgery with extraction and perioral abscess of the neck requiring drainage. Lymphatic:  Patient denies history of enlargement, inflammation, pain or suppuration. He denies history of lymphoma. Cardiovascular:  Patient denies history of palpations, heart murmur, irregular rhythm, chest pain. He denies orthopnea, rheumatic fever, scarlet fever, cold extremities. He denies edema. Normal cholesterol. Pulmonary:  Patient admits to exertional dyspnea, nocturnal dyspnea. He complains of cough. He complains of hemoptysis or pleurisy. He complains of sleep disorder. He reports symptoms of sleep apnea. CPAP 15 cm H20. Gastrointestinal: Patient denies changes in appetite. He denies dysphagia, odynophagia or abdominal pain. He denies hematochezia, melena, bowel habit changes or hemorrhoids. Colonscopy in 2018 (-) Abnormal adrenal gland lesion and MRI for adenoma was negative. Allergy/Immunology: The patient denies itching, hives, or angioedema. The patient admits to a problem with seasonal/environmental allergies. Genitourinary:  The patient denies a history of stones or UTI. Vascular: No history of peripheral vascular disease, carotid occlusive disease or aneurysms. Musculoskeletal: The patient reports a history of arthritis & joint pains. He denies loss of strength.  + Joint pains. Breasts: He denies history of masses, lumps, pain, or nipple changes. The patient denies a history of breast cancer. Neurological: Patient denies vertigo. He denies twitching, convulsions, loss of consciousness. No memory problems. Psychological: Patient denies moodiness, depression or anxiety. He denies obsessions, delusions, illusions or hallucinations. Cancer: No history of cancer reported. Endocrine: No history of goiter. emotional status is normal.  Cranial nerves II-XII are intact. DATA:   The data collected below information that was obtained, reviewed, analyzed and interpreted today. Spirometry was compared to previous test if available and demonstrates an FVC of 3.96 iters which is 90 % of predicted with an FEV1 of 3.01 liters which is 86% of predicted. FEV1/FVC ratio is 76 %. Mid expiratory flow rates are 49 % of predicted. Maximum voluntary ventilation is 134 liters per minute or 86 % of predicted. Flow volume loop shows no signs of intrathoracic or extrathoracic process. Impression: 20% decline with moderate/severe airflow obstruction     Patients Nitric Oxide level today: 41 ppb   A low Collin (less than 25) in adults implies non eosinophilic airway inflammation or absence of airway inflammation. A high Collin (greater than 50) in adults or a rising Collin with a greater than 40% change from a previously stable level implies uncontrolled or deteriorating eosinophilic airway inflammation. SLEEP STUDY: 2013:  IMPRESSION: Morbid obesity. Hypersomnolence indicated by sleep onset of 8 minutes. Fragmented sleep. Frequent arousals. Severe sleep apnea syndrome with an AHI of 80. No cardiac dysrhythmias. No leg movement disorder. RECOMMENDATIONS:  Start CPAP at 15 cm of water pressure using a medium wide ResMed FX    nasal mask with heat humidification. Counseled on driving or using heavy equipment until the sleep disorder  is treated. CT SCAN CHEST (8/2018) Impression: Previously seen nodule located adjacent to the oblique fissure within the superior segment of right lower lobe is stable since prior and measures approximately 3 mm. Additional smaller nodule seen along for further aspect of right lung base is also stable. No new nodule or mass. No airspace opacity or evidence of pleural effusion. The heart is normal in size. No pericardial effusion. View of the upper abdomen shows normal bilateral adrenal glands. MRI ABDOMEN: Cholelithiasis. Small indeterminate right adrenal lesion. CT SCAN CHEST [3/2018]: Right lun. Pulmonary nodule (series 3, image 45), measuring approximately 0.37 cm. 2. Pulmonary nodule (series 3, image 35), measuring approximately 0.47 cm. 3. No focal infiltrate/pneumonia, pneumothorax or pleural effusion is identified. Left lun. No noncalcified pulmonary nodule, spiculated mass, pleural effusion, or pneumothorax is identified. No supraclavicular, axillary, mediastinal lymphadenopathy. Hilar lymphadenopathy is difficult to ascertain given the lack of IV contrast administration. Visualized portions of the thyroid, heart, esophagus, stomach, gallbladder, liver, left adrenals, spleen, pancreas, pancreatic duct, common bile duct and visualized kidneys are unremarkable. Vertebral body heights and intervertebral disc spaces are well-preserved. There is normal sagittal alignment of the visualized spine. No lytic or blastic bony lesions. . Nodular abnormality projecting from the right adrenal gland, demonstrating Hounsfield unit on average characterization of 8.2 on average. Lesion measures approximately 1.9 cm longitudinal by 1.2 cm transverse. IMPRESSION:      Eugune Babinski is a 45 y.o. male with descriptive evidence of chronic asthmatic bronchitis who quit smoking in 2018 with a abnormal x-ray revealing a right middle lung field nodule which prompted a CT scan of the chest revealing small sub-centimeter nodules and adrenal adenoma. He has recurrent and frequent exacerbation for his asthma -final improvement and financial assistance approved for the injection biologic which can start once he calls the 1 800 number. .  With this in mind, we would like to proceed with the following;                      PLAN:   Debi Cesar has as you know chronic asthmatic bronchitis and the repeated events have been worrisome.  At this time because of the frequent flairs, we have asked for CBC with diff, ANCA, Ig E and Allergy serum testing to get him started on Biologic (Hillary Quick). He is to remain on Trelegy. We will try to get home on PAP program. We will start with sample of Nuclea home injections. He is to continue Singulair and nasal wash, with Mucinex. We will send him samples of trilogy and remain compliant with his CPAP and we will see him or talk to him in 2 weeks to see how things are going. In March 2018 his CT scan shows some's subtle changes such as signet rings and increasing bronchial thickening. His repeat CT scan is stable nodule at 3 mm in size. His PSA screen - was normal. Today again we discussed weight loss with him and recommended dietary counseling. He quit smoke in March/2018. We also discussed his sleep apnea and given that his index was greater than 90 and he is morbidly obese recommend evaluation for obesity hypoventilation syndrome and requested full pulmonary function assessment with arterial blood gas on room air. He follows with Dr. Sarah Pelayo - CPAP is at 15 cm H20. We requested new masks and tubing and he is currently comfortable with his pressures. Weight loss discussed. We also recommened cleaning his device with SloClean. We hope this updates you on our evaluation and clinical thinking. Thank you for entrusting us to participate in Wenceslao Hu [facility]. If you have any questions, please don't hesitate to call us at the [employer]. Sincerely,    Himanshu Bynum D.O., MPH, Sharyle Sheehan  Professor of Internal Medicine  Director of [employer]      Darryl Gar Meigs is a 45 y.o. male evaluated via telephone on 3/23/2021.       Consent:  He and/or health care decision maker is aware that that he may receive a bill for this telephone service, depending on his insurance coverage, and has provided verbal consent to proceed: Yes      Documentation:  I communicated with the patient and/or health care decision maker about medication effectiveness and availability. Details of this discussion including any medical advice provided. I affirm this is a Patient Initiated Episode with a Patient who has not had a related appointment within my department in the past 7 days or scheduled within the next 24 hours. Patient identification was verified at the start of the visit: Yes    Total Time: minutes: 5-10 minutes    The visit was conducted pursuant to the emergency declaration under the [address] [place] [address] and the [employer] General Act. Patient identification was verified, and a caregiver was present when appropriate. The patient was located in a state where the provider was credentialed to provide care.     Note: not billable if this call serves to triage the patient into an appointment for the relevant concern      Felice Villarreal

## 2021-05-10 DIAGNOSIS — E27.9 LESION OF ADRENAL GLAND (HCC): ICD-10-CM

## 2021-05-10 DIAGNOSIS — J45.30 MILD PERSISTENT REACTIVE AIRWAY DISEASE WITHOUT COMPLICATION: ICD-10-CM

## 2021-05-10 DIAGNOSIS — R91.1 PULMONARY NODULE: ICD-10-CM

## 2021-05-10 DIAGNOSIS — E66.01 MORBID OBESITY (HCC): ICD-10-CM

## 2021-05-10 DIAGNOSIS — G47.33 OBSTRUCTIVE SLEEP APNEA SYNDROME: ICD-10-CM

## 2021-05-10 RX ORDER — HYDRALAZINE HYDROCHLORIDE 25 MG/1
25 TABLET, FILM COATED ORAL 2 TIMES DAILY
Qty: 60 TABLET | Refills: 0 | Status: SHIPPED
Start: 2021-05-10 | End: 2021-06-10 | Stop reason: SDUPTHER

## 2021-05-10 RX ORDER — LOSARTAN POTASSIUM 100 MG/1
100 TABLET ORAL DAILY
Qty: 30 TABLET | Refills: 0 | Status: SHIPPED
Start: 2021-05-10 | End: 2021-06-10 | Stop reason: SDUPTHER

## 2021-05-10 RX ORDER — HYDROCHLOROTHIAZIDE 25 MG/1
25 TABLET ORAL DAILY
Qty: 30 TABLET | Refills: 0 | Status: SHIPPED
Start: 2021-05-10 | End: 2021-06-10 | Stop reason: SDUPTHER

## 2021-05-11 ENCOUNTER — TELEPHONE (OUTPATIENT)
Dept: PRIMARY CARE CLINIC | Age: 51
End: 2021-05-11

## 2021-05-11 NOTE — TELEPHONE ENCOUNTER
Pt needs medications however he does not want to  come in until after august because he does not have any insurance at this time [id number]

## 2021-06-10 DIAGNOSIS — E27.9 LESION OF ADRENAL GLAND (HCC): ICD-10-CM

## 2021-06-10 DIAGNOSIS — J45.30 MILD PERSISTENT REACTIVE AIRWAY DISEASE WITHOUT COMPLICATION: ICD-10-CM

## 2021-06-10 DIAGNOSIS — E66.01 MORBID OBESITY (HCC): ICD-10-CM

## 2021-06-10 DIAGNOSIS — G47.33 OBSTRUCTIVE SLEEP APNEA SYNDROME: ICD-10-CM

## 2021-06-10 DIAGNOSIS — R91.1 PULMONARY NODULE: ICD-10-CM

## 2021-06-11 RX ORDER — HYDROCHLOROTHIAZIDE 25 MG/1
25 TABLET ORAL DAILY
Qty: 30 TABLET | Refills: 2 | Status: SHIPPED
Start: 2021-06-11 | End: 2021-06-24 | Stop reason: SDUPTHER

## 2021-06-11 RX ORDER — HYDRALAZINE HYDROCHLORIDE 25 MG/1
25 TABLET, FILM COATED ORAL 2 TIMES DAILY
Qty: 60 TABLET | Refills: 2 | Status: SHIPPED
Start: 2021-06-11 | End: 2021-06-24 | Stop reason: SDUPTHER

## 2021-06-11 RX ORDER — LOSARTAN POTASSIUM 100 MG/1
100 TABLET ORAL DAILY
Qty: 30 TABLET | Refills: 2 | Status: SHIPPED
Start: 2021-06-11 | End: 2021-06-24 | Stop reason: SDUPTHER

## 2021-06-24 ENCOUNTER — OFFICE VISIT (OUTPATIENT)
Dept: PRIMARY CARE CLINIC | Age: 51
End: 2021-06-24

## 2021-06-24 VITALS
HEIGHT: 70 IN | RESPIRATION RATE: 18 BRPM | HEART RATE: 100 BPM | WEIGHT: 315 LBS | OXYGEN SATURATION: 97 % | SYSTOLIC BLOOD PRESSURE: 130 MMHG | BODY MASS INDEX: 45.1 KG/M2 | TEMPERATURE: 97.4 F | DIASTOLIC BLOOD PRESSURE: 80 MMHG

## 2021-06-24 DIAGNOSIS — E66.01 MORBID OBESITY (HCC): ICD-10-CM

## 2021-06-24 DIAGNOSIS — I10 ESSENTIAL HYPERTENSION: Primary | ICD-10-CM

## 2021-06-24 DIAGNOSIS — G47.33 OBSTRUCTIVE SLEEP APNEA SYNDROME: ICD-10-CM

## 2021-06-24 DIAGNOSIS — J41.1 MUCOPURULENT CHRONIC BRONCHITIS (HCC): ICD-10-CM

## 2021-06-24 DIAGNOSIS — J82.83 EOSINOPHILIC ASTHMA: ICD-10-CM

## 2021-06-24 DIAGNOSIS — E27.9 LESION OF ADRENAL GLAND (HCC): ICD-10-CM

## 2021-06-24 DIAGNOSIS — Z12.5 PROSTATE CANCER SCREENING: ICD-10-CM

## 2021-06-24 PROCEDURE — 99212 OFFICE O/P EST SF 10 MIN: CPT | Performed by: INTERNAL MEDICINE

## 2021-06-24 RX ORDER — HYDRALAZINE HYDROCHLORIDE 25 MG/1
25 TABLET, FILM COATED ORAL 2 TIMES DAILY
Qty: 180 TABLET | Refills: 3 | Status: SHIPPED | OUTPATIENT
Start: 2021-06-24

## 2021-06-24 RX ORDER — LOSARTAN POTASSIUM 100 MG/1
100 TABLET ORAL DAILY
Qty: 90 TABLET | Refills: 3 | Status: SHIPPED | OUTPATIENT
Start: 2021-06-24

## 2021-06-24 RX ORDER — HYDROCHLOROTHIAZIDE 25 MG/1
25 TABLET ORAL DAILY
Qty: 90 TABLET | Refills: 3 | Status: SHIPPED | OUTPATIENT
Start: 2021-06-24

## 2021-06-24 RX ORDER — FLUTICASONE FUROATE, UMECLIDINIUM BROMIDE AND VILANTEROL TRIFENATATE 100; 62.5; 25 UG/1; UG/1; UG/1
1 POWDER RESPIRATORY (INHALATION) DAILY
COMMUNITY

## 2021-06-24 SDOH — ECONOMIC STABILITY: FOOD INSECURITY: WITHIN THE PAST 12 MONTHS, YOU WORRIED THAT YOUR FOOD WOULD RUN OUT BEFORE YOU GOT MONEY TO BUY MORE.: NEVER TRUE

## 2021-06-24 SDOH — ECONOMIC STABILITY: FOOD INSECURITY: WITHIN THE PAST 12 MONTHS, THE FOOD YOU BOUGHT JUST DIDN'T LAST AND YOU DIDN'T HAVE MONEY TO GET MORE.: NEVER TRUE

## 2021-06-24 ASSESSMENT — PATIENT HEALTH QUESTIONNAIRE - PHQ9
SUM OF ALL RESPONSES TO PHQ QUESTIONS 1-9: 0
SUM OF ALL RESPONSES TO PHQ9 QUESTIONS 1 & 2: 0
1. LITTLE INTEREST OR PLEASURE IN DOING THINGS: 0
2. FEELING DOWN, DEPRESSED OR HOPELESS: 0

## 2021-06-24 ASSESSMENT — SOCIAL DETERMINANTS OF HEALTH (SDOH): HOW HARD IS IT FOR YOU TO PAY FOR THE VERY BASICS LIKE FOOD, HOUSING, MEDICAL CARE, AND HEATING?: NOT HARD AT ALL

## 2021-06-24 NOTE — PROGRESS NOTES
Wenceslao Hu  6/24/21     Chief Complaint   Patient presents with    Hypertension     CHECK UP        Allergies   Allergen Reactions    Amlodipine     Naproxen     Penicillins         Current Outpatient Medications   Medication Sig Dispense Refill    fluticasone-umeclidin-vilant (TRELEGY ELLIPTA) 100-62.5-25 MCG/INH AEPB Inhale 1 puff into the lungs daily      hydroCHLOROthiazide (HYDRODIURIL) 25 MG tablet Take 1 tablet by mouth daily 30 tablet 2    hydrALAZINE (APRESOLINE) 25 MG tablet Take 1 tablet by mouth 2 times daily 60 tablet 2    losartan (COZAAR) 100 MG tablet Take 1 tablet by mouth daily 30 tablet 2    Cholecalciferol (VITAMIN D3) 48599 units CAPS Take 1 capsule by mouth daily      montelukast (SINGULAIR) 10 MG tablet Take 1 tablet by mouth nightly 30 tablet 12    albuterol (ACCUNEB) 0.63 MG/3ML nebulizer solution Take 3 mLs by nebulization every 4 hours as needed for Wheezing or Shortness of Breath 270 mL 6    Fluticasone-Umeclidin-Vilant 100-62.5-25 MCG/INH AEPB Inhale 100 mcg into the lungs daily 3 each 4    albuterol sulfate HFA (PROAIR HFA) 108 (90 Base) MCG/ACT inhaler Inhale 2 puffs into the lungs every 4 hours as needed for Wheezing or Shortness of Breath 1 Inhaler 6     No current facility-administered medications for this visit. HPI: Patient comes in for long overdue follow-up visit. Since his last visit he has been living in [place]. He did go on disability due to his multiple medical problems. 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 That has been reasonably stable since he has been on Nucala injections. He continues to struggle with his weight and remains morbidly obese. He remains on all of his usual meds and supplements the same as listed on his med list, which was reviewed with him. Review of Systems: as per HPI      Physical Exam:    Patient is a 45 y.o. male.   Patient appears to be in no distress. Breathing comfortably at rest.  He is alert and oriented. Ambulates without assistance. HEENT: normal.  Neck supple, no adenopathy or bruits. Heart RR, no MGR. Lungs clear. No wheezing noted at this time. Abd: normal  Ext: no edema. Peripheral pulses: normal.  No neurologic deficits noted. Assessment:    Essential hypertension, fair control on current meds. 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    Mucopurulent chronic bronchitis related to above, currently stable and followed by his pulmonologist.    Obstructive sleep apnea syndrome, stable on CPAP. Morbid obesity ([address] patient strongly advised to try to lose some weight. History of right adrenal gland nodule noted on CAT scan and MRI in 2018. He will need a follow-up CT scan of his chest and abdomen after he is enrolled in Medicare hopefully within the next few months. Discussion Notes: He will continue all of his usual meds and inhalers and supplements the same as listed on his med list.  He is strongly advised to try to follow a low-sodium, calorie restricted, heart healthy diet and exercise as tolerated. He is strongly advised to lose some weight which would be beneficial for his overall health. He will follow-up with his pulmonologist as per his instructions for management of his COPD and asthma and also for follow-up of his history of pulmonary nodules and right adrenal gland nodule.   Return here in 3 months for his annual physical.

## 2021-09-30 DIAGNOSIS — J45.909 REACTIVE AIRWAY DISEASE WITHOUT COMPLICATION, UNSPECIFIED ASTHMA SEVERITY, UNSPECIFIED WHETHER PERSISTENT: ICD-10-CM

## 2021-09-30 DIAGNOSIS — J41.1 MUCOPURULENT CHRONIC BRONCHITIS (HCC): Primary | ICD-10-CM

## 2021-09-30 RX ORDER — MONTELUKAST SODIUM 10 MG/1
10 TABLET ORAL NIGHTLY
Qty: 30 TABLET | Refills: 12 | Status: SHIPPED | OUTPATIENT
Start: 2021-09-30 | End: 2021-10-30

## 2023-04-06 ENCOUNTER — OFFICE VISIT (OUTPATIENT)
Dept: PULMONOLOGY | Age: 53
End: 2023-04-06
Payer: MEDICARE

## 2023-04-06 VITALS
TEMPERATURE: 97.3 F | HEART RATE: 92 BPM | DIASTOLIC BLOOD PRESSURE: 73 MMHG | HEIGHT: 70 IN | RESPIRATION RATE: 18 BRPM | OXYGEN SATURATION: 96 % | SYSTOLIC BLOOD PRESSURE: 123 MMHG | BODY MASS INDEX: 45.1 KG/M2 | WEIGHT: 315 LBS

## 2023-04-06 DIAGNOSIS — J45.40 MODERATE PERSISTENT ASTHMA, UNSPECIFIED WHETHER COMPLICATED: Primary | ICD-10-CM

## 2023-04-06 DIAGNOSIS — E66.01 MORBID OBESITY (HCC): ICD-10-CM

## 2023-04-06 DIAGNOSIS — J41.1 MUCOPURULENT CHRONIC BRONCHITIS (HCC): ICD-10-CM

## 2023-04-06 DIAGNOSIS — G47.33 OBSTRUCTIVE SLEEP APNEA SYNDROME: ICD-10-CM

## 2023-04-06 DIAGNOSIS — G89.29 OTHER CHRONIC PAIN: ICD-10-CM

## 2023-04-06 DIAGNOSIS — J45.20 MILD INTERMITTENT REACTIVE AIRWAY DISEASE WITHOUT COMPLICATION: ICD-10-CM

## 2023-04-06 DIAGNOSIS — R91.1 PULMONARY NODULE: ICD-10-CM

## 2023-04-06 LAB
EXPIRATORY TIME: 6.97 SEC
FEF 25-75% %PRED-PRE: 106 L/SEC
FEF 25-75% PRED: 3.02 L/SEC
FEF 25-75%-PRE: 3.21 L/SEC
FENO: 32 PPB
FEV1 %PRED-PRE: 100 %
FEV1 PRED: 3.37 L
FEV1/FVC %PRED-PRE: 101 %
FEV1/FVC PRED: 79 %
FEV1/FVC: 80 %
FEV1: 3.4 L
FVC %PRED-PRE: 99 %
FVC PRED: 4.27 L
FVC: 4.23 L
PEF %PRED-PRE: 100 L/SEC
PEF PRED: 8.94 L/SEC
PEF-PRE: 8.97 L/SEC

## 2023-04-06 PROCEDURE — 94010 BREATHING CAPACITY TEST: CPT | Performed by: INTERNAL MEDICINE

## 2023-04-06 PROCEDURE — 95012 NITRIC OXIDE EXP GAS DETER: CPT | Performed by: INTERNAL MEDICINE

## 2023-04-06 PROCEDURE — 99214 OFFICE O/P EST MOD 30 MIN: CPT | Performed by: INTERNAL MEDICINE

## 2023-04-06 PROCEDURE — 94726 PLETHYSMOGRAPHY LUNG VOLUMES: CPT | Performed by: INTERNAL MEDICINE

## 2023-04-06 PROCEDURE — 99213 OFFICE O/P EST LOW 20 MIN: CPT | Performed by: INTERNAL MEDICINE

## 2023-04-06 PROCEDURE — 90677 PCV20 VACCINE IM: CPT | Performed by: INTERNAL MEDICINE

## 2023-04-06 RX ORDER — ALBUTEROL SULFATE 90 UG/1
2 AEROSOL, METERED RESPIRATORY (INHALATION) 4 TIMES DAILY PRN
Qty: 18 G | Refills: 5 | Status: SHIPPED | OUTPATIENT
Start: 2023-04-06

## 2023-04-06 RX ORDER — FLUTICASONE FUROATE, UMECLIDINIUM BROMIDE AND VILANTEROL TRIFENATATE 100; 62.5; 25 UG/1; UG/1; UG/1
1 POWDER RESPIRATORY (INHALATION) DAILY
Qty: 1 EACH | Refills: 12 | Status: SHIPPED | OUTPATIENT
Start: 2023-04-06

## 2023-04-06 ASSESSMENT — PULMONARY FUNCTION TESTS
FEV1/FVC_PERCENT_PREDICTED_PRE: 101
FENO: 32
FEV1/FVC_PREDICTED: 79
FEV1_PREDICTED: 3.37
FVC: 4.23
FEV1: 3.40
FVC_PREDICTED: 4.27
FVC_PERCENT_PREDICTED_PRE: 99
FEV1/FVC: 80
FEV1_PERCENT_PREDICTED_PRE: 100

## 2023-04-06 NOTE — PROGRESS NOTES
Patient to follow up with physician in 6 months. Patient will be referred to a pain clinic. Patient given a sample of Nucala to bridge him between authorizations. Patient given the prevnar 20 during office visit under the direction of Dr. Terrie Last.
Cholelithiasis. Small indeterminate right adrenal lesion. CT SCAN CHEST [3/2018]: Right lun. Pulmonary nodule (series 3, image 45), measuring approximately 0.37 cm. 2. Pulmonary nodule (series 3, image 35), measuring approximately 0.47 cm. 3. No focal infiltrate/pneumonia, pneumothorax or pleural effusion is identified. Left lun. No noncalcified pulmonary nodule, spiculated mass, pleural effusion, or pneumothorax is identified. No supraclavicular, axillary, mediastinal lymphadenopathy. Hilar lymphadenopathy is difficult to ascertain given the lack of IV contrast administration. Visualized portions of the thyroid, heart, esophagus, stomach, gallbladder, liver, left adrenals, spleen, pancreas, pancreatic duct, common bile duct and visualized kidneys are unremarkable. Vertebral body heights and intervertebral disc spaces are well-preserved. There is normal sagittal alignment of the visualized spine. No lytic or blastic bony lesions. . Nodular abnormality projecting from the right adrenal gland, demonstrating Hounsfield unit on average characterization of 8.2 on average. Lesion measures approximately 1.9 cm longitudinal by 1.2 cm transverse. IMPRESSION:      Judson French is a 47 y.o. male with descriptive evidence of chronic asthmatic bronchitis who quit smoking in 2018 with a abnormal x-ray revealing a right middle lung field nodule which prompted a CT scan of the chest revealing small sub-centimeter nodules and adrenal adenoma. He has recurrent and frequent exacerbation for his asthma -final improvement and financial assistance approved for the injection biologic which can start once he calls the 1 800 number. .  With this in mind, we would like to proceed with the following;                      PLAN:   Dewayne Graff has as you know chronic asthmatic bronchitis and the repeated events have been worrisome.  At this time because of the frequent flairs, we have asked for CBC with diff, ANCA, Ig E and

## 2023-04-17 ENCOUNTER — OFFICE VISIT (OUTPATIENT)
Dept: PAIN MANAGEMENT | Age: 53
End: 2023-04-17
Payer: MEDICARE

## 2023-04-17 VITALS
HEART RATE: 79 BPM | HEIGHT: 70 IN | TEMPERATURE: 97.4 F | WEIGHT: 315 LBS | RESPIRATION RATE: 16 BRPM | OXYGEN SATURATION: 93 % | SYSTOLIC BLOOD PRESSURE: 155 MMHG | DIASTOLIC BLOOD PRESSURE: 88 MMHG | BODY MASS INDEX: 45.1 KG/M2

## 2023-04-17 DIAGNOSIS — M47.817 LUMBOSACRAL SPONDYLOSIS WITHOUT MYELOPATHY: ICD-10-CM

## 2023-04-17 DIAGNOSIS — M43.16 SPONDYLOLISTHESIS OF LUMBAR REGION: ICD-10-CM

## 2023-04-17 DIAGNOSIS — M51.36 DDD (DEGENERATIVE DISC DISEASE), LUMBAR: Primary | ICD-10-CM

## 2023-04-17 DIAGNOSIS — E66.9 OBESITY, UNSPECIFIED CLASSIFICATION, UNSPECIFIED OBESITY TYPE, UNSPECIFIED WHETHER SERIOUS COMORBIDITY PRESENT: ICD-10-CM

## 2023-04-17 DIAGNOSIS — M54.16 LUMBAR RADICULAR PAIN: ICD-10-CM

## 2023-04-17 PROBLEM — M51.369 DDD (DEGENERATIVE DISC DISEASE), LUMBAR: Status: ACTIVE | Noted: 2023-04-17

## 2023-04-17 PROCEDURE — 99204 OFFICE O/P NEW MOD 45 MIN: CPT | Performed by: ANESTHESIOLOGY

## 2023-04-17 RX ORDER — ATORVASTATIN CALCIUM 10 MG/1
10 TABLET, FILM COATED ORAL DAILY
COMMUNITY
Start: 2023-03-04

## 2023-04-17 RX ORDER — OMEPRAZOLE 40 MG/1
CAPSULE, DELAYED RELEASE ORAL DAILY
COMMUNITY
Start: 2023-03-04

## 2023-04-17 RX ORDER — DAPAGLIFLOZIN 5 MG/1
5 TABLET, FILM COATED ORAL DAILY
COMMUNITY
Start: 2023-03-16

## 2023-04-17 NOTE — PROGRESS NOTES
Patient:  Marylin Dubois,  1970  Date of Service:  23      Patient presents to [facility] with complaints of low back and leg pain that started 3 years ago and has been getting worse. He states the pain began following No specific cause    Pain is constant and is described as aching, dull, sharp, and miserable. He rates the pain as a 10/10 on his worst day , 10/10 on his best day, and a 10/10 on average on the VAS scale. Pain does radiate to both lower extremities. He  has numbness, tingling, weakness of the both lower extremities. Alleviating factors include: rest.  Aggravating factors include:  walking, standing, bending, lifting. He states that the pain does not keep him from sleeping at night. He is not on NSAIDS and  is not on anticoagulation medications. Previous treatments: Physical Therapy, Epidural Steroid Injection, and medications. .      Personal Expectations from this treatment: increase activity and decrease pain    BP (!) 155/88   Pulse 79   Temp 97.4 °F (36.3 °C) (Infrared)   Resp 16   Ht 5' 10\" (1.778 m)   Wt (!) 325 lb (147.4 kg)   SpO2 93%   BMI 46.63 kg/m²     No LMP for male patient.
Dr. Irene Rhodes,   Thank you for referring Mr. Martha Mullen and allowing us to participate in his care. Please do not hesitate to contact me if you have any questions regarding his care.     Yaz Rodriguez MD    CC:    DO Jyotsna Velasquez,  [address] [facility], [address] [place] [address]  [phone]

## 2023-04-21 ENCOUNTER — TELEPHONE (OUTPATIENT)
Dept: PAIN MANAGEMENT | Age: 53
End: 2023-04-21

## 2023-04-26 ENCOUNTER — TELEPHONE (OUTPATIENT)
Dept: PAIN MANAGEMENT | Age: 53
End: 2023-04-26

## 2023-04-26 NOTE — TELEPHONE ENCOUNTER
Call to Wenceslao Hu that procedure was approved for 5/4/2023 and that Sae should call him a few days before for the pre op call and between 2:00 PM and 4:00 PM  the business day before with the arrival time. Instructed Wenceslao to hold ibuprofen for 24 hours, naprosyn for 4 days and any aspirin containing products or fish oil for 7 days. Instructed to call office back if any questions. Wenceslao verbalized understanding.     Electronically signed by Chandan Nelson RN on 4/26/2023 at 3:56 PM

## 2023-04-28 RX ORDER — MEPOLIZUMAB 100 MG/ML
100 INJECTION, SOLUTION SUBCUTANEOUS
COMMUNITY

## 2023-05-04 ENCOUNTER — HOSPITAL ENCOUNTER (OUTPATIENT)
Age: 53
Setting detail: OUTPATIENT SURGERY
Discharge: HOME | End: 2023-05-04
Attending: ANESTHESIOLOGY | Admitting: ANESTHESIOLOGY
Payer: MEDICARE

## 2023-05-04 ENCOUNTER — HOSPITAL ENCOUNTER (OUTPATIENT)
Dept: GENERAL RADIOLOGY | Age: 53
Setting detail: OUTPATIENT SURGERY
Discharge: HOME | End: 2023-05-06
Attending: ANESTHESIOLOGY
Payer: MEDICARE

## 2023-05-04 VITALS
HEIGHT: 70 IN | WEIGHT: 315 LBS | DIASTOLIC BLOOD PRESSURE: 74 MMHG | OXYGEN SATURATION: 99 % | RESPIRATION RATE: 20 BRPM | HEART RATE: 77 BPM | BODY MASS INDEX: 45.1 KG/M2 | SYSTOLIC BLOOD PRESSURE: 134 MMHG | TEMPERATURE: 97.2 F

## 2023-05-04 DIAGNOSIS — R52 PAIN MANAGEMENT: ICD-10-CM

## 2023-05-04 LAB — METER GLUCOSE: 79 MG/DL (ref 74–99)

## 2023-05-04 PROCEDURE — 7100000010 HC PHASE II RECOVERY - FIRST 15 MIN: Performed by: ANESTHESIOLOGY

## 2023-05-04 PROCEDURE — 3600000002 HC SURGERY LEVEL 2 BASE: Performed by: ANESTHESIOLOGY

## 2023-05-04 PROCEDURE — 3600000012 HC SURGERY LEVEL 2 ADDTL 15MIN: Performed by: ANESTHESIOLOGY

## 2023-05-04 PROCEDURE — 2709999900 HC NON-CHARGEABLE SUPPLY: Performed by: ANESTHESIOLOGY

## 2023-05-04 PROCEDURE — 6360000004 HC RX CONTRAST MEDICATION: Performed by: ANESTHESIOLOGY

## 2023-05-04 PROCEDURE — 62323 NJX INTERLAMINAR LMBR/SAC: CPT | Performed by: ANESTHESIOLOGY

## 2023-05-04 PROCEDURE — 2500000003 HC RX 250 WO HCPCS: Performed by: ANESTHESIOLOGY

## 2023-05-04 PROCEDURE — 7100000011 HC PHASE II RECOVERY - ADDTL 15 MIN: Performed by: ANESTHESIOLOGY

## 2023-05-04 PROCEDURE — 3209999900 FLUORO FOR SURGICAL PROCEDURES

## 2023-05-04 PROCEDURE — 82962 GLUCOSE BLOOD TEST: CPT

## 2023-05-04 PROCEDURE — 6360000002 HC RX W HCPCS: Performed by: ANESTHESIOLOGY

## 2023-05-04 RX ORDER — SODIUM CHLORIDE 0.9 % (FLUSH) 0.9 %
5-40 SYRINGE (ML) INJECTION EVERY 12 HOURS SCHEDULED
Status: DISCONTINUED | OUTPATIENT
Start: 2023-05-04 | End: 2023-05-04 | Stop reason: HOSPADM

## 2023-05-04 RX ORDER — LIDOCAINE HYDROCHLORIDE 5 MG/ML
INJECTION, SOLUTION INFILTRATION; INTRAVENOUS PRN
Status: DISCONTINUED | OUTPATIENT
Start: 2023-05-04 | End: 2023-05-04 | Stop reason: ALTCHOICE

## 2023-05-04 RX ORDER — SODIUM CHLORIDE 9 MG/ML
INJECTION, SOLUTION INTRAVENOUS PRN
Status: DISCONTINUED | OUTPATIENT
Start: 2023-05-04 | End: 2023-05-04 | Stop reason: HOSPADM

## 2023-05-04 RX ORDER — METHYLPREDNISOLONE ACETATE 40 MG/ML
INJECTION, SUSPENSION INTRA-ARTICULAR; INTRALESIONAL; INTRAMUSCULAR; SOFT TISSUE PRN
Status: DISCONTINUED | OUTPATIENT
Start: 2023-05-04 | End: 2023-05-04 | Stop reason: ALTCHOICE

## 2023-05-04 RX ORDER — SODIUM CHLORIDE 0.9 % (FLUSH) 0.9 %
5-40 SYRINGE (ML) INJECTION PRN
Status: DISCONTINUED | OUTPATIENT
Start: 2023-05-04 | End: 2023-05-04 | Stop reason: HOSPADM

## 2023-05-04 ASSESSMENT — PAIN SCALES - GENERAL: PAINLEVEL_OUTOF10: 0

## 2023-05-04 ASSESSMENT — PAIN - FUNCTIONAL ASSESSMENT
PAIN_FUNCTIONAL_ASSESSMENT: 0-10
PAIN_FUNCTIONAL_ASSESSMENT: PREVENTS OR INTERFERES WITH ALL ACTIVE AND SOME PASSIVE ACTIVITIES

## 2023-05-04 NOTE — OP NOTE
Operative Note      Patient: Toni Paulino  YOB: 1970  MRN: [id number]    Date of Procedure: 2023    Pre-Op Diagnosis Codes:     * Lumbar radiculopathy [M54.16]     * DDD (degenerative disc disease), lumbar [M51.36]    Post-Op Diagnosis: Same       Procedure(s):  LUMBAR EPIDURAL STEROID INJECTION UNDER FLUOROSCOPIC GUIDANCE AT L5-S1    Surgeon(s):  Marylin Kilpatrick MD    Assistant:   * No surgical staff found *    Anesthesia: Local    Estimated Blood Loss (mL): Minimal    Complications: None    Specimens:   * No specimens in log *    Implants:  * No implants in log *      Drains: * No LDAs found *    Findings: good needle placement      Detailed Description of Procedure:   2023    Patient: Toni Paulino  :  1970  Age:  47 y.o. Sex:  male     PRE-OPERATIVE DIAGNOSIS: Lumbar disc displacement, lumbar radiculopathy. POST-OPERATIVE DIAGNOSIS: Same. PROCEDURE: Fluoroscopic guided therapeutic lumbar epidural steroid injection at the L5-S1 level (# 1). SURGEON: Marylin Kilpatrick MD    ANESTHESIA: Local    ESTIMATED BLOOD LOSS: None.  ______________________________________________________________________    BRIEF HISTORY:  Wenceslao Millan comes in today for first lumbar epidural injection at L5-S1 level. The potential complications of this procedure were discussed with him again today. He has elected to undergo the aforementioned procedure. Aime complete History & Physical examination were reviewed in depth, a copy of which is in the chart. DESCRIPTION OF PROCEDURE:    After confirming written and informed consent, a time-out was performed and Aime name and date of birth, the procedure to be performed as well as the plan for the location of the needle insertion were confirmed. The patient was brought into the procedure room and placed in the prone position on the fluoroscopy table.  A pillow was placed under the patient's lower abdomen/upper pelvis to

## 2023-05-04 NOTE — DISCHARGE INSTRUCTIONS
[facility]  Home Going Instructions    1-Go home, rest for the remainder of the day  2-Please do not lift over 20 pounds the day of the injection  3-If you received sedation No: alcohol, driving, operating lawn mowers, plows, tractors or other dangerous equipment until next morning. Do not make important decisions or sign legal documents for 24 hours. You may experience light headedness, dizziness, nausea or sleepiness after sedation. Do not stay alone. A responsible adult must be with you for 24 hours. You could be nauseated from the medications you have received. Your IV site may be sore and bruised. 4-No dietary restrictions     5-Resume all medications the same day, blood thinners to be resumed 24 hours after injection if you were instructed to stop any. 6-Keep the surgical site clean and dry, you may shower the next morning and remove the      dressing. 7- No sitz baths, tub baths or hot tubs/swimming for 24 hours. 8- If you have any pain at the injection site(s), application of an ice pack to the area should be       helpful, 20 minutes on/20 minutes off for next 48 hours. 9- Call [facility] immediately at if you develop.   Fever greater than 100.4 F  Have bleeding or drainage from the puncture site  Have progressive Leg/arm numbness and or weakness  Loss of control of bowel and or bladder (wet/soil yourself)  Severe headache with inability to lift head  10-You may return to work the next day

## 2023-05-04 NOTE — INTERVAL H&P NOTE
Update History & Physical    The patient's History and Physical of April 17, 2023 was reviewed with the patient and I examined the patient. There was no change. The surgical site was confirmed by the patient and me. Plan: The risks, benefits, expected outcome, and alternative to the recommended procedure have been discussed with the patient. Patient understands and wants to proceed with the procedure.      Electronically signed by Lucinda Abad MD on 5/4/2023

## 2023-05-12 ENCOUNTER — TELEPHONE (OUTPATIENT)
Dept: PULMONOLOGY | Age: 53
End: 2023-05-12

## 2023-05-12 DIAGNOSIS — J82.83 EOSINOPHILIC ASTHMA: ICD-10-CM

## 2023-05-12 DIAGNOSIS — J45.909 REACTIVE AIRWAY DISEASE WITHOUT COMPLICATION, UNSPECIFIED ASTHMA SEVERITY, UNSPECIFIED WHETHER PERSISTENT: Primary | ICD-10-CM

## 2023-05-12 NOTE — TELEPHONE ENCOUNTER
Patient's [facility]ngel [facility] [id number] was called and a new script for his Beto Jefferson was given over the phone.

## 2023-07-10 ENCOUNTER — TELEPHONE (OUTPATIENT)
Dept: PULMONOLOGY | Age: 53
End: 2023-07-10

## 2023-10-06 ENCOUNTER — OFFICE VISIT (OUTPATIENT)
Dept: PULMONOLOGY | Age: 53
End: 2023-10-06
Payer: MEDICARE

## 2023-10-06 VITALS
OXYGEN SATURATION: 95 % | TEMPERATURE: 97.5 F | SYSTOLIC BLOOD PRESSURE: 125 MMHG | BODY MASS INDEX: 44.1 KG/M2 | HEART RATE: 91 BPM | RESPIRATION RATE: 18 BRPM | WEIGHT: 315 LBS | HEIGHT: 71 IN | DIASTOLIC BLOOD PRESSURE: 78 MMHG

## 2023-10-06 DIAGNOSIS — E66.01 MORBID OBESITY (HCC): ICD-10-CM

## 2023-10-06 DIAGNOSIS — G47.33 OBSTRUCTIVE SLEEP APNEA SYNDROME: ICD-10-CM

## 2023-10-06 DIAGNOSIS — J45.20 MILD INTERMITTENT REACTIVE AIRWAY DISEASE WITHOUT COMPLICATION: Primary | ICD-10-CM

## 2023-10-06 LAB
EXPIRATORY TIME: 7.01 SEC
FEF 25-75% %PRED-PRE: 100 L/SEC
FEF 25-75% PRED: 2.99 L/SEC
FEF 25-75%-PRE: 3 L/SEC
FENO: 28 PPB
FEV1 %PRED-PRE: 103 %
FEV1 PRED: 3.35 L
FEV1/FVC %PRED-PRE: 98 %
FEV1/FVC PRED: 79 %
FEV1/FVC: 78 %
FEV1: 3.48 L
FVC %PRED-PRE: 105 %
FVC PRED: 4.25 L
FVC: 4.47 L
PEF %PRED-PRE: NORMAL
PEF PRED: NORMAL
PEF-PRE: NORMAL

## 2023-10-06 PROCEDURE — 94010 BREATHING CAPACITY TEST: CPT | Performed by: INTERNAL MEDICINE

## 2023-10-06 PROCEDURE — 95012 NITRIC OXIDE EXP GAS DETER: CPT | Performed by: INTERNAL MEDICINE

## 2023-10-06 PROCEDURE — 90686 IIV4 VACC NO PRSV 0.5 ML IM: CPT | Performed by: INTERNAL MEDICINE

## 2023-10-06 ASSESSMENT — PULMONARY FUNCTION TESTS
FENO: 28
FEV1: 3.48
FVC_PERCENT_PREDICTED_PRE: 105
FEV1/FVC_PERCENT_PREDICTED_PRE: 98
FVC: 4.47
FEV1_PERCENT_PREDICTED_PRE: 103
FEV1_PREDICTED: 3.35
FEV1/FVC_PREDICTED: 79
FVC_PREDICTED: 4.25
FEV1/FVC: 78

## 2023-10-06 NOTE — PROGRESS NOTES
Patient to follow up with physician in 6 months. Patient given samples of trelegy and nucala during office visit under the direction of Dr. Chloe Edwards.   Patient given the flu shot during office visit under the direction of Dr. Chloe Edwards
medical events in the patient's family , including disease which may be hereditary or place the patient at risk were reviewed. He does not know his father. His mother  from cancer at age 48 with metastatic disease. He has 2 sisters older one with shortness of breath issues and diabetes. REVIEW OF SYSTEMS: The patients health assessment form was reviewed. Constitutional: General health fair . The patient complains of weight changes. The patient denies any recent fevers or weakness. Head: Patient denies any history of trauma, convulsive disorder or syncope. Skin:  Patient denies history of changes in pigmentation, eruptions or pruritus. Eyes: Patient denies any history of color blindness, photophobia, diplopia, inflammation,. He  denies cataracts. Denies glaucoma. Ears: Patient denies history of deafness, tinnitus, pain, discharge or recurrent infections. Nose/Throat: Patient denies history of rhinitis, chronic nasal discharge, drainage, epistaxis, obstruction or nasal polyps. Patient denies history of soreness of mouth or tongue. Patient denies history of hoarseness, voice changes, sore throats or recurrent tonsillitis. Dental surgery with extraction and perioral abscess of the neck requiring drainage. Lymphatic:  Patient denies history of enlargement, inflammation, pain or suppuration. He denies history of lymphoma. Cardiovascular:  Patient denies history of palpations, heart murmur, irregular rhythm, chest pain. He denies orthopnea, rheumatic fever, scarlet fever, cold extremities. He denies edema. Normal cholesterol. Pulmonary:  Patient admits to exertional dyspnea, nocturnal dyspnea. He complains of cough. He complains of hemoptysis or pleurisy. He complains of sleep disorder. He reports symptoms of sleep apnea. CPAP 15 cm H20. Gastrointestinal: Patient denies changes in appetite. He denies dysphagia, odynophagia or abdominal pain.  He denies hematochezia, melena, bowel habit

## 2023-11-20 DIAGNOSIS — J45.909 REACTIVE AIRWAY DISEASE WITHOUT COMPLICATION, UNSPECIFIED ASTHMA SEVERITY, UNSPECIFIED WHETHER PERSISTENT: ICD-10-CM

## 2023-11-20 DIAGNOSIS — I10 ESSENTIAL HYPERTENSION: ICD-10-CM

## 2023-11-20 DIAGNOSIS — J41.1 MUCOPURULENT CHRONIC BRONCHITIS (HCC): ICD-10-CM

## 2023-11-20 RX ORDER — MONTELUKAST SODIUM 10 MG/1
10 TABLET ORAL NIGHTLY
Qty: 90 TABLET | Refills: 3 | Status: SHIPPED | OUTPATIENT
Start: 2023-11-20

## 2023-11-20 RX ORDER — LOSARTAN POTASSIUM 100 MG/1
100 TABLET ORAL DAILY
Qty: 90 TABLET | Refills: 3 | Status: SHIPPED | OUTPATIENT
Start: 2023-11-20

## 2023-11-20 RX ORDER — HYDRALAZINE HYDROCHLORIDE 25 MG/1
25 TABLET, FILM COATED ORAL 2 TIMES DAILY
Qty: 180 TABLET | Refills: 3 | Status: SHIPPED | OUTPATIENT
Start: 2023-11-20

## 2023-11-20 RX ORDER — HYDROCHLOROTHIAZIDE 25 MG/1
25 TABLET ORAL DAILY
Qty: 90 TABLET | Refills: 3 | Status: SHIPPED | OUTPATIENT
Start: 2023-11-20

## 2023-11-20 NOTE — PROGRESS NOTES
Pt stopped in office today to obtain refills on meds and get sample of Nucala. Pt given sample per Dr Louise Kumar orders. All scripts sent for refill as requested per Dr Louise Kumar orders.

## 2024-04-09 ENCOUNTER — TELEPHONE (OUTPATIENT)
Dept: PULMONOLOGY | Age: 54
End: 2024-04-09

## 2024-04-09 NOTE — TELEPHONE ENCOUNTER
Patient called in needing refills on losartan, HCTZ and Hydralazine called in to [facility] in [place]

## 2024-04-10 DIAGNOSIS — I10 ESSENTIAL HYPERTENSION: ICD-10-CM

## 2024-04-10 RX ORDER — HYDRALAZINE HYDROCHLORIDE 25 MG/1
25 TABLET, FILM COATED ORAL 2 TIMES DAILY
Qty: 180 TABLET | Refills: 3 | Status: SHIPPED | OUTPATIENT
Start: 2024-04-10

## 2024-04-10 RX ORDER — LOSARTAN POTASSIUM 100 MG/1
100 TABLET ORAL DAILY
Qty: 90 TABLET | Refills: 3 | Status: SHIPPED | OUTPATIENT
Start: 2024-04-10

## 2024-04-12 ENCOUNTER — OFFICE VISIT (OUTPATIENT)
Dept: PULMONOLOGY | Age: 54
End: 2024-04-12

## 2024-04-12 VITALS
HEIGHT: 71 IN | SYSTOLIC BLOOD PRESSURE: 143 MMHG | WEIGHT: 313 LBS | OXYGEN SATURATION: 96 % | TEMPERATURE: 97 F | RESPIRATION RATE: 18 BRPM | DIASTOLIC BLOOD PRESSURE: 88 MMHG | BODY MASS INDEX: 43.82 KG/M2 | HEART RATE: 87 BPM

## 2024-04-12 DIAGNOSIS — G47.33 OSA (OBSTRUCTIVE SLEEP APNEA): ICD-10-CM

## 2024-04-12 DIAGNOSIS — J45.20 MILD INTERMITTENT REACTIVE AIRWAY DISEASE WITHOUT COMPLICATION: ICD-10-CM

## 2024-04-12 DIAGNOSIS — R91.1 PULMONARY NODULE: ICD-10-CM

## 2024-04-12 DIAGNOSIS — G47.33 OBSTRUCTIVE SLEEP APNEA SYNDROME: Primary | ICD-10-CM

## 2024-04-12 LAB
EXPIRATORY TIME: NORMAL
FEF 25-75% %PRED-PRE: NORMAL
FEF 25-75% PRED: NORMAL
FEF 25-75-PRE: NORMAL
FEV1 %PRED-PRE: 98 %
FEV1 PRED: 3.34 L
FEV1/FVC %PRED-PRE: 98 %
FEV1/FVC PRED: 79 %
FEV1/FVC: 78 %
FEV1: 3.29 L
FVC %PRED-PRE: 99 %
FVC PRED: 4.24 L
FVC: 4.24 L
PEF %PRED-PRE: NORMAL
PEF PRED: NORMAL
PEF-PRE: NORMAL

## 2024-04-12 PROCEDURE — 94010 BREATHING CAPACITY TEST: CPT | Performed by: INTERNAL MEDICINE

## 2024-04-12 RX ORDER — HYDROCHLOROTHIAZIDE 25 MG/1
25 TABLET ORAL EVERY MORNING
Qty: 90 TABLET | Refills: 12 | Status: SHIPPED | OUTPATIENT
Start: 2024-04-12

## 2024-04-12 RX ORDER — HYDRALAZINE HYDROCHLORIDE 25 MG/1
25 TABLET, FILM COATED ORAL 4 TIMES DAILY
Qty: 90 TABLET | Refills: 12 | Status: SHIPPED | OUTPATIENT
Start: 2024-04-12

## 2024-04-12 RX ORDER — ALBUTEROL SULFATE 2.5 MG/3ML
2.5 SOLUTION RESPIRATORY (INHALATION) 4 TIMES DAILY PRN
Qty: 120 EACH | Refills: 3 | Status: SHIPPED | OUTPATIENT
Start: 2024-04-12

## 2024-04-12 RX ORDER — LOSARTAN POTASSIUM 100 MG/1
100 TABLET ORAL DAILY
Qty: 30 TABLET | Refills: 12 | Status: SHIPPED | OUTPATIENT
Start: 2024-04-12

## 2024-04-12 RX ORDER — FLUTICASONE FUROATE, UMECLIDINIUM BROMIDE AND VILANTEROL TRIFENATATE 100; 62.5; 25 UG/1; UG/1; UG/1
1 POWDER RESPIRATORY (INHALATION) DAILY
Qty: 1 EACH | Refills: 12 | Status: SHIPPED | OUTPATIENT
Start: 2024-04-12

## 2024-04-12 RX ORDER — ALBUTEROL SULFATE 90 UG/1
2 AEROSOL, METERED RESPIRATORY (INHALATION) 4 TIMES DAILY PRN
Qty: 54 G | Refills: 5 | Status: SHIPPED | OUTPATIENT
Start: 2024-04-12

## 2024-04-12 ASSESSMENT — PULMONARY FUNCTION TESTS
FVC: 4.24
FEV1/FVC_PERCENT_PREDICTED_PRE: 98
FVC_PREDICTED: 4.24
FEV1_PERCENT_PREDICTED_PRE: 98
FEV1: 3.29
FEV1/FVC: 78
FEV1/FVC_PREDICTED: 79
FEV1_PREDICTED: 3.34
FVC_PERCENT_PREDICTED_PRE: 99

## 2024-04-12 NOTE — PROGRESS NOTES
[facility]  [facility]  Department of Internal Medicine  Division of Pulmonary, Critical Care and Sleep Medicine  Office Note    Dear Myriam, Amor OGDEN MD    We had the pleasure of seeing Wenceslao Hu, in the [facility] at [facility] regarding his lung nodule, sleep apnea, cough consistent with chronic bronchitis and morbid obesity. Severe CORAZON on CPAP 15 cmH20    HISTORY OF PRESENT ILLNESS:    Wenceslao Hu is a 53 y.o. is a morbidly obese -American gentleman who presents for evaluation of a cough that has been persistent for about 1 year. States the cough recently got worse with green mucus production over the past few months. He is a one pack per week smoker and is been smoking more since having difficulty with regular employment. 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  There is no extensive family history of chronic obstructive pulmonary disease noted. He did not have asthma as a child. He has no known allergies except for nonsteroidal's.  For the cough he was placed on albuterol and a chest x-ray was performed showing a right mid lung field pulmonary nodule.  With the above issues pulmonary consultation was requested.  In addition to the above he has known sleep apnea.  He has an index of greater than 90 with nocturnal hypoxemia in 2014 I recommended CPAP at 9 cm of water pressure he currently states he is on 15 cm of water pressure and is followed by Dr. Owens in his sleep laboratory.  Significant weight gain has been noted over the past 6 months. Strong family history of breast cancer noted. His PSA was normal.    On today's office visit, Wenceslao Hu doing really well.  He started Mounjaro for weight loss and has lost 25 pounds with less appetite and snacking noted.  With this his lung function FEV1 remains at 103% of predicted and stable.  As you know he is a

## 2024-04-12 NOTE — PROGRESS NOTES
Patient to follow up with physician in 6 months.  Patient given sample of Nucala during office visit.  Patient needs new cpap mask and tubing from [facility].  Patient had medications refilled during office visit.

## 2024-04-16 ENCOUNTER — TELEPHONE (OUTPATIENT)
Dept: PULMONOLOGY | Age: 54
End: 2024-04-16

## 2024-04-16 NOTE — TELEPHONE ENCOUNTER
Patient called in stating that he needs his CPAP supplies and the order should go to [facility] and faxed to [phone]

## 2024-04-18 DIAGNOSIS — G47.33 OBSTRUCTIVE SLEEP APNEA SYNDROME: Primary | ICD-10-CM

## 2024-08-14 ENCOUNTER — TELEPHONE (OUTPATIENT)
Dept: PULMONOLOGY | Age: 54
End: 2024-08-14

## 2024-08-14 NOTE — TELEPHONE ENCOUNTER
Mailed letter to patient to inform him of the CT of the Chest that is scheduled for him at [place], [place] . This test is scheduled on Saturday, October 12, 2024 at  10:00 am. Please arrive 15 minutes prior to appointment time. No Test Prep is needed

## 2024-08-21 DIAGNOSIS — I10 ESSENTIAL HYPERTENSION: ICD-10-CM

## 2024-08-21 RX ORDER — LOSARTAN POTASSIUM 100 MG/1
100 TABLET ORAL DAILY
Qty: 30 TABLET | Refills: 12 | Status: SHIPPED | OUTPATIENT
Start: 2024-08-21

## 2024-08-21 RX ORDER — HYDROCHLOROTHIAZIDE 25 MG/1
25 TABLET ORAL DAILY
Qty: 90 TABLET | Refills: 3 | Status: SHIPPED | OUTPATIENT
Start: 2024-08-21

## 2024-08-21 RX ORDER — HYDRALAZINE HYDROCHLORIDE 25 MG/1
25 TABLET, FILM COATED ORAL 2 TIMES DAILY
Qty: 180 TABLET | Refills: 3 | Status: SHIPPED | OUTPATIENT
Start: 2024-08-21

## 2024-09-04 ENCOUNTER — TELEPHONE (OUTPATIENT)
Dept: PULMONOLOGY | Age: 54
End: 2024-09-04

## 2024-09-04 DIAGNOSIS — J45.20 MILD INTERMITTENT REACTIVE AIRWAY DISEASE WITHOUT COMPLICATION: ICD-10-CM

## 2024-09-04 DIAGNOSIS — J82.83 EOSINOPHILIC ASTHMA: Primary | ICD-10-CM

## 2024-09-04 RX ORDER — MEPOLIZUMAB 100 MG/ML
100 INJECTION, SOLUTION SUBCUTANEOUS ONCE
Qty: 0.84 ML | Refills: 0 | Status: SHIPPED | COMMUNITY
Start: 2024-09-04 | End: 2024-09-04

## 2024-09-04 NOTE — TELEPHONE ENCOUNTER
Pt stopped in office and picked up sample of Nucala for self administration. Per orders sample of med given.

## 2024-10-03 ENCOUNTER — TELEPHONE (OUTPATIENT)
Dept: PULMONOLOGY | Age: 54
End: 2024-10-03

## 2024-10-03 DIAGNOSIS — J82.83 EOSINOPHILIC ASTHMA: Primary | ICD-10-CM

## 2024-10-03 RX ORDER — MEPOLIZUMAB 100 MG/ML
100 INJECTION, SOLUTION SUBCUTANEOUS ONCE
Qty: 0.84 ML | Refills: 0 | Status: SHIPPED | COMMUNITY
Start: 2024-10-03 | End: 2024-10-03

## 2024-11-01 ENCOUNTER — HOSPITAL ENCOUNTER (OUTPATIENT)
Age: 54
Discharge: HOME | End: 2024-11-01
Payer: MEDICARE

## 2024-11-01 LAB
25(OH)D3 SERPL-MCNC: 66.9 NG/ML (ref 30–100)
ALBUMIN SERPL-MCNC: 4.2 G/DL (ref 3.5–5.2)
ALP SERPL-CCNC: 63 U/L (ref 40–129)
ALT SERPL-CCNC: 11 U/L (ref 0–40)
ANION GAP SERPL CALCULATED.3IONS-SCNC: 10 MMOL/L (ref 7–16)
AST SERPL-CCNC: 16 U/L (ref 0–39)
BILIRUB SERPL-MCNC: 0.4 MG/DL (ref 0–1.2)
BUN SERPL-MCNC: 11 MG/DL (ref 6–20)
CALCIUM SERPL-MCNC: 9.6 MG/DL (ref 8.6–10.2)
CHLORIDE SERPL-SCNC: 105 MMOL/L (ref 98–107)
CHOLEST SERPL-MCNC: 139 MG/DL
CO2 SERPL-SCNC: 26 MMOL/L (ref 22–29)
CREAT SERPL-MCNC: 0.9 MG/DL (ref 0.7–1.2)
ERYTHROCYTE [DISTWIDTH] IN BLOOD BY AUTOMATED COUNT: 15.3 % (ref 11.5–15)
GFR, ESTIMATED: >90 ML/MIN/1.73M2
GLUCOSE SERPL-MCNC: 98 MG/DL (ref 74–99)
HBA1C MFR BLD: 6.1 % (ref 4–5.6)
HCT VFR BLD AUTO: 45.1 % (ref 37–54)
HDLC SERPL-MCNC: 63 MG/DL
HGB BLD-MCNC: 14.4 G/DL (ref 12.5–16.5)
LDLC SERPL CALC-MCNC: 61 MG/DL
MCH RBC QN AUTO: 29.9 PG (ref 26–35)
MCHC RBC AUTO-ENTMCNC: 31.9 G/DL (ref 32–34.5)
MCV RBC AUTO: 93.6 FL (ref 80–99.9)
PLATELET # BLD AUTO: 220 K/UL (ref 130–450)
PMV BLD AUTO: 9.6 FL (ref 7–12)
POTASSIUM SERPL-SCNC: 4.2 MMOL/L (ref 3.5–5)
PROT SERPL-MCNC: 7.6 G/DL (ref 6.4–8.3)
PSA SERPL-MCNC: 0.23 NG/ML (ref 0–4)
RBC # BLD AUTO: 4.82 M/UL (ref 3.8–5.8)
SODIUM SERPL-SCNC: 141 MMOL/L (ref 132–146)
TRIGL SERPL-MCNC: 77 MG/DL
TSH SERPL DL<=0.05 MIU/L-ACNC: 0.35 UIU/ML (ref 0.27–4.2)
VLDLC SERPL CALC-MCNC: 15 MG/DL
WBC OTHER # BLD: 4.7 K/UL (ref 4.5–11.5)

## 2024-11-01 PROCEDURE — 85027 COMPLETE CBC AUTOMATED: CPT

## 2024-11-01 PROCEDURE — 80061 LIPID PANEL: CPT

## 2024-11-01 PROCEDURE — 82306 VITAMIN D 25 HYDROXY: CPT

## 2024-11-01 PROCEDURE — 80053 COMPREHEN METABOLIC PANEL: CPT

## 2024-11-01 PROCEDURE — G0103 PSA SCREENING: HCPCS

## 2024-11-01 PROCEDURE — 83036 HEMOGLOBIN GLYCOSYLATED A1C: CPT

## 2024-11-01 PROCEDURE — 36415 COLL VENOUS BLD VENIPUNCTURE: CPT

## 2024-11-01 PROCEDURE — 84443 ASSAY THYROID STIM HORMONE: CPT

## 2025-01-17 ENCOUNTER — HOSPITAL ENCOUNTER (OUTPATIENT)
Age: 55
Discharge: HOME | End: 2025-01-17
Payer: MEDICARE

## 2025-01-17 LAB
25(OH)D3 SERPL-MCNC: 20.6 NG/ML (ref 30–100)
ALBUMIN SERPL-MCNC: 4 G/DL (ref 3.5–5.2)
ALP SERPL-CCNC: 57 U/L (ref 40–129)
ALT SERPL-CCNC: 13 U/L (ref 0–40)
ANION GAP SERPL CALCULATED.3IONS-SCNC: 11 MMOL/L (ref 7–16)
AST SERPL-CCNC: 21 U/L (ref 0–39)
BILIRUB SERPL-MCNC: 0.6 MG/DL (ref 0–1.2)
BUN SERPL-MCNC: 11 MG/DL (ref 6–20)
CALCIUM SERPL-MCNC: 9.5 MG/DL (ref 8.6–10.2)
CHLORIDE SERPL-SCNC: 106 MMOL/L (ref 98–107)
CHOLEST SERPL-MCNC: 120 MG/DL
CO2 SERPL-SCNC: 25 MMOL/L (ref 22–29)
CREAT SERPL-MCNC: 1 MG/DL (ref 0.7–1.2)
ERYTHROCYTE [DISTWIDTH] IN BLOOD BY AUTOMATED COUNT: 15.1 % (ref 11.5–15)
GFR, ESTIMATED: >90 ML/MIN/1.73M2
GLUCOSE SERPL-MCNC: 102 MG/DL (ref 74–99)
HBA1C MFR BLD: 6.1 % (ref 4–5.6)
HCT VFR BLD AUTO: 44 % (ref 37–54)
HDLC SERPL-MCNC: 64 MG/DL
HGB BLD-MCNC: 14.3 G/DL (ref 12.5–16.5)
LDLC SERPL CALC-MCNC: 44 MG/DL
MCH RBC QN AUTO: 30.1 PG (ref 26–35)
MCHC RBC AUTO-ENTMCNC: 32.5 G/DL (ref 32–34.5)
MCV RBC AUTO: 92.6 FL (ref 80–99.9)
PLATELET # BLD AUTO: 223 K/UL (ref 130–450)
PMV BLD AUTO: 9.8 FL (ref 7–12)
POTASSIUM SERPL-SCNC: 4 MMOL/L (ref 3.5–5)
PROT SERPL-MCNC: 7.3 G/DL (ref 6.4–8.3)
PSA SERPL-MCNC: 0.47 NG/ML (ref 0–4)
RBC # BLD AUTO: 4.75 M/UL (ref 3.8–5.8)
SODIUM SERPL-SCNC: 142 MMOL/L (ref 132–146)
TRIGL SERPL-MCNC: 60 MG/DL
TSH SERPL DL<=0.05 MIU/L-ACNC: 0.68 UIU/ML (ref 0.27–4.2)
VLDLC SERPL CALC-MCNC: 12 MG/DL
WBC OTHER # BLD: 5 K/UL (ref 4.5–11.5)

## 2025-01-17 PROCEDURE — 36415 COLL VENOUS BLD VENIPUNCTURE: CPT

## 2025-01-17 PROCEDURE — 84443 ASSAY THYROID STIM HORMONE: CPT

## 2025-01-17 PROCEDURE — 85027 COMPLETE CBC AUTOMATED: CPT

## 2025-01-17 PROCEDURE — 80053 COMPREHEN METABOLIC PANEL: CPT

## 2025-01-17 PROCEDURE — 80061 LIPID PANEL: CPT

## 2025-01-17 PROCEDURE — G0103 PSA SCREENING: HCPCS

## 2025-01-17 PROCEDURE — 82306 VITAMIN D 25 HYDROXY: CPT

## 2025-01-17 PROCEDURE — 83036 HEMOGLOBIN GLYCOSYLATED A1C: CPT

## 2025-02-07 ENCOUNTER — HOSPITAL ENCOUNTER (OUTPATIENT)
Dept: CARDIOLOGY | Age: 55
Discharge: HOME | End: 2025-02-09
Payer: MEDICARE

## 2025-02-07 VITALS
SYSTOLIC BLOOD PRESSURE: 143 MMHG | DIASTOLIC BLOOD PRESSURE: 88 MMHG | BODY MASS INDEX: 42 KG/M2 | WEIGHT: 300 LBS | HEIGHT: 71 IN

## 2025-02-07 DIAGNOSIS — I50.1 LEFT VENTRICULAR FAILURE (HCC): ICD-10-CM

## 2025-02-07 LAB
ECHO AV CUSP MM: 2 CM
ECHO AV MEAN GRADIENT: 4 MMHG
ECHO AV MEAN VELOCITY: 1 M/S
ECHO AV PEAK GRADIENT: 10 MMHG
ECHO AV PEAK VELOCITY: 1.6 M/S
ECHO AV VTI: 24.3 CM
ECHO BSA: 2.61 M2
ECHO EST RA PRESSURE: 3 MMHG
ECHO LA DIAMETER INDEX: 1.43 CM/M2
ECHO LA DIAMETER: 3.6 CM
ECHO LA VOL A-L A2C: 61 ML (ref 18–58)
ECHO LA VOL A-L A4C: 45 ML (ref 18–58)
ECHO LA VOL MOD A2C: 59 ML (ref 18–58)
ECHO LA VOL MOD A4C: 40 ML (ref 18–58)
ECHO LA VOLUME AREA LENGTH: 53 ML
ECHO LA VOLUME INDEX A-L A2C: 24 ML/M2 (ref 16–34)
ECHO LA VOLUME INDEX A-L A4C: 18 ML/M2 (ref 16–34)
ECHO LA VOLUME INDEX AREA LENGTH: 21 ML/M2 (ref 16–34)
ECHO LA VOLUME INDEX MOD A2C: 24 ML/M2 (ref 16–34)
ECHO LA VOLUME INDEX MOD A4C: 16 ML/M2 (ref 16–34)
ECHO LV EF PHYSICIAN: 70 %
ECHO LV FRACTIONAL SHORTENING: 45 % (ref 28–44)
ECHO LV INTERNAL DIMENSION DIASTOLE INDEX: 2.03 CM/M2
ECHO LV INTERNAL DIMENSION DIASTOLIC: 5.1 CM (ref 4.2–5.9)
ECHO LV INTERNAL DIMENSION SYSTOLIC INDEX: 1.12 CM/M2
ECHO LV INTERNAL DIMENSION SYSTOLIC: 2.8 CM
ECHO LV IVSD: 1.4 CM (ref 0.6–1)
ECHO LV MASS 2D: 270.1 G (ref 88–224)
ECHO LV MASS INDEX 2D: 107.6 G/M2 (ref 49–115)
ECHO LV POSTERIOR WALL DIASTOLIC: 1.2 CM (ref 0.6–1)
ECHO LV RELATIVE WALL THICKNESS RATIO: 0.47
ECHO MV A VELOCITY: 0.96 M/S
ECHO MV E DECELERATION TIME (DT): 216.6 MS
ECHO MV E VELOCITY: 0.61 M/S
ECHO MV E/A RATIO: 0.64
ECHO MV MAX VELOCITY: 0.9 M/S
ECHO MV MEAN GRADIENT: 1 MMHG
ECHO MV MEAN VELOCITY: 0.5 M/S
ECHO MV PEAK GRADIENT: 3 MMHG
ECHO MV VTI: 19.1 CM
ECHO RIGHT VENTRICULAR SYSTOLIC PRESSURE (RVSP): 22 MMHG
ECHO RV INTERNAL DIMENSION: 2.8 CM
ECHO TV REGURGITANT MAX VELOCITY: 2.2 M/S
ECHO TV REGURGITANT PEAK GRADIENT: 20 MMHG

## 2025-02-07 PROCEDURE — 93306 TTE W/DOPPLER COMPLETE: CPT | Performed by: INTERNAL MEDICINE

## 2025-02-07 PROCEDURE — 93306 TTE W/DOPPLER COMPLETE: CPT

## 2025-02-14 ENCOUNTER — OFFICE VISIT (OUTPATIENT)
Dept: PULMONOLOGY | Age: 55
End: 2025-02-14

## 2025-02-14 VITALS
RESPIRATION RATE: 16 BRPM | SYSTOLIC BLOOD PRESSURE: 115 MMHG | DIASTOLIC BLOOD PRESSURE: 70 MMHG | OXYGEN SATURATION: 95 % | TEMPERATURE: 97.7 F | HEART RATE: 71 BPM

## 2025-02-14 DIAGNOSIS — G47.33 OSA (OBSTRUCTIVE SLEEP APNEA): Primary | ICD-10-CM

## 2025-02-14 DIAGNOSIS — R09.02 HYPOXEMIA: ICD-10-CM

## 2025-02-14 DIAGNOSIS — J82.83 EOSINOPHILIC ASTHMA: ICD-10-CM

## 2025-02-14 NOTE — PROGRESS NOTES
interpreted today.     Spirometry was compared to previous test if available and demonstrates an FVC of 4.24 iters which is 99% of predicted with an FEV1 of 3.29 liters which is 98 % of predicted.  FEV1/FVC ratio is 78 %. Mid expiratory flow rates are 100 % of predicted.  Maximum voluntary ventilation is 134 liters per minute or 86 % of predicted.  Flow volume loop shows no signs of intrathoracic or extrathoracic process. Impression: Normalized airflow obstruction     Patients Nitric Oxide level: 28 ppb down from 41ppb   A low Collin (less than 25) in adults implies non eosinophilic airway inflammation or absence of airway inflammation.  A high Collin (greater than 50) in adults or a rising Collin with a greater than 40% change from a previously stable level implies uncontrolled or deteriorating eosinophilic airway inflammation.    SLEEP STUDY: 2013:  IMPRESSION: Morbid obesity. Hypersomnolence indicated by sleep onset of 8 minutes.  Fragmented sleep. Frequent arousals. Severe sleep apnea syndrome with an AHI of 80.    No cardiac dysrhythmias. No leg movement disorder. RECOMMENDATIONS:  Start CPAP at 15 cm of water pressure using a medium wide ResMed FX    nasal mask with heat humidification.   Counseled on driving or using heavy equipment until the sleep disorder  is treated.     CT SCAN CHEST (2018) Impression: Previously seen nodule located adjacent to the oblique fissure within the superior segment of right lower lobe is stable since prior and measures approximately 3 mm. Additional smaller nodule seen along for further aspect of right lung base is also stable. No new nodule or mass. No airspace opacity or evidence of pleural effusion. The heart is normal in size. No pericardial effusion. View of the upper abdomen shows normal bilateral adrenal glands.    MRI ABDOMEN: Cholelithiasis. Small indeterminate right adrenal lesion.     CT SCAN CHEST [3/2018]: Right lun. Pulmonary nodule (series 3, image 45), measuring

## 2025-02-25 ENCOUNTER — HOSPITAL ENCOUNTER (EMERGENCY)
Age: 55
Discharge: HOME | End: 2025-02-25
Attending: EMERGENCY MEDICINE
Payer: MEDICARE

## 2025-02-25 ENCOUNTER — APPOINTMENT (OUTPATIENT)
Dept: ULTRASOUND IMAGING | Age: 55
End: 2025-02-25
Payer: MEDICARE

## 2025-02-25 VITALS
SYSTOLIC BLOOD PRESSURE: 121 MMHG | OXYGEN SATURATION: 94 % | TEMPERATURE: 97.2 F | HEART RATE: 69 BPM | RESPIRATION RATE: 18 BRPM | DIASTOLIC BLOOD PRESSURE: 83 MMHG

## 2025-02-25 DIAGNOSIS — K80.20 SYMPTOMATIC CHOLELITHIASIS: Primary | ICD-10-CM

## 2025-02-25 DIAGNOSIS — E66.9 OBESITY, UNSPECIFIED CLASS, UNSPECIFIED OBESITY TYPE, UNSPECIFIED WHETHER SERIOUS COMORBIDITY PRESENT: ICD-10-CM

## 2025-02-25 LAB
ALBUMIN SERPL-MCNC: 4.2 G/DL (ref 3.5–5.2)
ALP SERPL-CCNC: 58 U/L (ref 40–129)
ALT SERPL-CCNC: 10 U/L (ref 0–40)
ANION GAP SERPL CALCULATED.3IONS-SCNC: 10 MMOL/L (ref 7–16)
AST SERPL-CCNC: 13 U/L (ref 0–39)
BASOPHILS # BLD: 0.02 K/UL (ref 0–0.2)
BASOPHILS NFR BLD: 0 % (ref 0–2)
BILIRUB DIRECT SERPL-MCNC: <0.2 MG/DL (ref 0–0.3)
BILIRUB INDIRECT SERPL-MCNC: NORMAL MG/DL (ref 0–1)
BILIRUB SERPL-MCNC: 0.7 MG/DL (ref 0–1.2)
BILIRUB UR QL STRIP: ABNORMAL
BUN SERPL-MCNC: 11 MG/DL (ref 6–20)
CALCIUM SERPL-MCNC: 9.7 MG/DL (ref 8.6–10.2)
CHLORIDE SERPL-SCNC: 102 MMOL/L (ref 98–107)
CLARITY UR: CLEAR
CO2 SERPL-SCNC: 28 MMOL/L (ref 22–29)
COLOR UR: YELLOW
CREAT SERPL-MCNC: 1 MG/DL (ref 0.7–1.2)
EOSINOPHIL # BLD: 0.01 K/UL (ref 0.05–0.5)
EOSINOPHILS RELATIVE PERCENT: 0 % (ref 0–6)
ERYTHROCYTE [DISTWIDTH] IN BLOOD BY AUTOMATED COUNT: 15 % (ref 11.5–15)
GFR, ESTIMATED: 87 ML/MIN/1.73M2
GLUCOSE SERPL-MCNC: 113 MG/DL (ref 74–99)
GLUCOSE UR STRIP-MCNC: NEGATIVE MG/DL
HCT VFR BLD AUTO: 45.8 % (ref 37–54)
HGB BLD-MCNC: 15.2 G/DL (ref 12.5–16.5)
HGB UR QL STRIP.AUTO: NEGATIVE
IMM GRANULOCYTES # BLD AUTO: 0.03 K/UL (ref 0–0.58)
IMM GRANULOCYTES NFR BLD: 0 % (ref 0–5)
KETONES UR STRIP-MCNC: 15 MG/DL
LEUKOCYTE ESTERASE UR QL STRIP: NEGATIVE
LIPASE SERPL-CCNC: 11 U/L (ref 13–60)
LYMPHOCYTES NFR BLD: 2.53 K/UL (ref 1.5–4)
LYMPHOCYTES RELATIVE PERCENT: 33 % (ref 20–42)
MCH RBC QN AUTO: 30 PG (ref 26–35)
MCHC RBC AUTO-ENTMCNC: 33.2 G/DL (ref 32–34.5)
MCV RBC AUTO: 90.3 FL (ref 80–99.9)
MONOCYTES NFR BLD: 0.79 K/UL (ref 0.1–0.95)
MONOCYTES NFR BLD: 10 % (ref 2–12)
NEUTROPHILS NFR BLD: 55 % (ref 43–80)
NEUTS SEG NFR BLD: 4.19 K/UL (ref 1.8–7.3)
NITRITE UR QL STRIP: NEGATIVE
PH UR STRIP: 6 [PH] (ref 5–8)
PLATELET # BLD AUTO: 232 K/UL (ref 130–450)
PMV BLD AUTO: 9.3 FL (ref 7–12)
POTASSIUM SERPL-SCNC: 4.3 MMOL/L (ref 3.5–5)
PROT SERPL-MCNC: 7.9 G/DL (ref 6.4–8.3)
PROT UR STRIP-MCNC: 30 MG/DL
RBC # BLD AUTO: 5.07 M/UL (ref 3.8–5.8)
RBC #/AREA URNS HPF: ABNORMAL /HPF
SODIUM SERPL-SCNC: 140 MMOL/L (ref 132–146)
SP GR UR STRIP: >1.03 (ref 1–1.03)
UROBILINOGEN UR STRIP-ACNC: 1 EU/DL (ref 0–1)
WBC #/AREA URNS HPF: ABNORMAL /HPF
WBC OTHER # BLD: 7.6 K/UL (ref 4.5–11.5)

## 2025-02-25 PROCEDURE — 2580000003 HC RX 258: Performed by: EMERGENCY MEDICINE

## 2025-02-25 PROCEDURE — 6370000000 HC RX 637 (ALT 250 FOR IP)

## 2025-02-25 PROCEDURE — 82248 BILIRUBIN DIRECT: CPT

## 2025-02-25 PROCEDURE — 6360000002 HC RX W HCPCS: Performed by: EMERGENCY MEDICINE

## 2025-02-25 PROCEDURE — 6370000000 HC RX 637 (ALT 250 FOR IP): Performed by: EMERGENCY MEDICINE

## 2025-02-25 PROCEDURE — 80053 COMPREHEN METABOLIC PANEL: CPT

## 2025-02-25 PROCEDURE — 99284 EMERGENCY DEPT VISIT MOD MDM: CPT

## 2025-02-25 PROCEDURE — 83690 ASSAY OF LIPASE: CPT

## 2025-02-25 PROCEDURE — 81001 URINALYSIS AUTO W/SCOPE: CPT

## 2025-02-25 PROCEDURE — 96374 THER/PROPH/DIAG INJ IV PUSH: CPT

## 2025-02-25 PROCEDURE — 85025 COMPLETE CBC W/AUTO DIFF WBC: CPT

## 2025-02-25 PROCEDURE — 76705 ECHO EXAM OF ABDOMEN: CPT

## 2025-02-25 RX ORDER — OXYCODONE AND ACETAMINOPHEN 5; 325 MG/1; MG/1
1 TABLET ORAL ONCE
Status: COMPLETED | OUTPATIENT
Start: 2025-02-25 | End: 2025-02-25

## 2025-02-25 RX ORDER — METRONIDAZOLE 500 MG/1
500 TABLET ORAL ONCE
Status: COMPLETED | OUTPATIENT
Start: 2025-02-25 | End: 2025-02-25

## 2025-02-25 RX ORDER — ONDANSETRON 4 MG/1
4 TABLET, ORALLY DISINTEGRATING ORAL 3 TIMES DAILY PRN
Qty: 21 TABLET | Refills: 0 | Status: SHIPPED | OUTPATIENT
Start: 2025-02-25 | End: 2025-02-25

## 2025-02-25 RX ORDER — OXYCODONE AND ACETAMINOPHEN 5; 325 MG/1; MG/1
1 TABLET ORAL EVERY 6 HOURS PRN
Qty: 12 TABLET | Refills: 0 | Status: SHIPPED | OUTPATIENT
Start: 2025-02-25 | End: 2025-02-25

## 2025-02-25 RX ORDER — ONDANSETRON 2 MG/ML
4 INJECTION INTRAMUSCULAR; INTRAVENOUS ONCE
Status: COMPLETED | OUTPATIENT
Start: 2025-02-25 | End: 2025-02-25

## 2025-02-25 RX ORDER — OXYCODONE AND ACETAMINOPHEN 5; 325 MG/1; MG/1
1 TABLET ORAL EVERY 6 HOURS PRN
Qty: 12 TABLET | Refills: 0 | Status: SHIPPED | OUTPATIENT
Start: 2025-02-25 | End: 2025-02-28

## 2025-02-25 RX ORDER — ONDANSETRON 4 MG/1
4 TABLET, ORALLY DISINTEGRATING ORAL 3 TIMES DAILY PRN
Qty: 21 TABLET | Refills: 0 | Status: SHIPPED | OUTPATIENT
Start: 2025-02-25

## 2025-02-25 RX ORDER — METRONIDAZOLE 500 MG/1
500 TABLET ORAL 2 TIMES DAILY
Qty: 14 TABLET | Refills: 0 | Status: SHIPPED | OUTPATIENT
Start: 2025-02-25 | End: 2025-03-04

## 2025-02-25 RX ORDER — CEFDINIR 300 MG/1
300 CAPSULE ORAL ONCE
Status: COMPLETED | OUTPATIENT
Start: 2025-02-25 | End: 2025-02-25

## 2025-02-25 RX ORDER — CEFDINIR 300 MG/1
300 CAPSULE ORAL 2 TIMES DAILY
Qty: 14 CAPSULE | Refills: 0 | Status: SHIPPED | OUTPATIENT
Start: 2025-02-25 | End: 2025-02-25

## 2025-02-25 RX ORDER — CEFDINIR 300 MG/1
300 CAPSULE ORAL 2 TIMES DAILY
Qty: 14 CAPSULE | Refills: 0 | Status: SHIPPED | OUTPATIENT
Start: 2025-02-25 | End: 2025-03-04

## 2025-02-25 RX ORDER — 0.9 % SODIUM CHLORIDE 0.9 %
1000 INTRAVENOUS SOLUTION INTRAVENOUS ONCE
Status: COMPLETED | OUTPATIENT
Start: 2025-02-25 | End: 2025-02-25

## 2025-02-25 RX ORDER — METRONIDAZOLE 500 MG/1
500 TABLET ORAL 2 TIMES DAILY
Qty: 14 TABLET | Refills: 0 | Status: SHIPPED | OUTPATIENT
Start: 2025-02-25 | End: 2025-02-25

## 2025-02-25 RX ADMIN — SODIUM CHLORIDE 1000 ML: 0.9 INJECTION, SOLUTION INTRAVENOUS at 13:26

## 2025-02-25 RX ADMIN — CEFDINIR 300 MG: 300 CAPSULE ORAL at 18:52

## 2025-02-25 RX ADMIN — ONDANSETRON 4 MG: 2 INJECTION, SOLUTION INTRAMUSCULAR; INTRAVENOUS at 13:24

## 2025-02-25 RX ADMIN — METRONIDAZOLE 500 MG: 500 TABLET ORAL at 18:53

## 2025-02-25 RX ADMIN — OXYCODONE HYDROCHLORIDE AND ACETAMINOPHEN 1 TABLET: 5; 325 TABLET ORAL at 13:16

## 2025-02-25 ASSESSMENT — ENCOUNTER SYMPTOMS
VOMITING: 1
NAUSEA: 1
DIARRHEA: 0
ABDOMINAL PAIN: 1

## 2025-02-25 ASSESSMENT — PAIN DESCRIPTION - LOCATION: LOCATION: ABDOMEN

## 2025-02-25 ASSESSMENT — PAIN SCALES - GENERAL: PAINLEVEL_OUTOF10: 8

## 2025-02-25 ASSESSMENT — PAIN DESCRIPTION - DESCRIPTORS: DESCRIPTORS: ACHING;DULL;GNAWING

## 2025-02-25 NOTE — ED PROVIDER NOTES
ED PROVIDER NOTE    Chief Complaint   Patient presents with    Abdominal Pain    Vomiting     Patient c/o vomiting and abdominal pain since Friday .        HPI:  2/25/25,   Time: 12:54 PM DELONTE Hu is a 54 y.o. male presenting to the ED for abdominal pain.  Initial onset 3 days ago, persistent since onset.  Pain is in the epigastric region and right upper quadrant, nonradiating, no aggravating alleviating factors.  He does have associated nausea and vomiting.  Has not been able to keep any oral intake down since 3 days ago.  Associated sweats and chills.  Associated rhinorrhea.  No diarrhea.  Last bowel movement was today, small amount of stool with some black mixed in.  Does have history of hernia repair in [place] several years ago.  No drug or alcohol use.  No chest pain or shortness of breath. Normal urine output.    Chart review: hx of arthritis, asthma, COPD, DM, HTN, CORAZON    Reviewed outpatient pulmonology progress note from 2/14/2025 by Dr. Comer:  Dx chronic asthmatic bronchitis    Review of Systems:     Review of Systems  Pertinent positives and negatives as stated in HPI     --------------------------------------------- PAST HISTORY ---------------------------------------------  Past Medical History:   Past Medical History:   Diagnosis Date    Adrenal adenoma     Arthritis     Asthma     Back pain     COPD (chronic obstructive pulmonary disease) (HCC)     Diabetes mellitus (HCC)     Family history of breast cancer     Hypertension     Morbid obesity     Mucopurulent chronic bronchitis (HCC) 03/19/2018    Obstructive sleep apnea syndrome 03/19/2018    Pulmonary nodule 03/19/2018    Sleep apnea     Cpap 15 cm BMS    Smoker        Past Surgical History:   Past Surgical History:   Procedure Laterality Date    CYST REMOVAL      HERNIA REPAIR      PAIN MANAGEMENT PROCEDURE N/A 5/4/2023    LUMBAR EPIDURAL STEROID INJECTION UNDER FLUOROSCOPIC GUIDANCE AT L5-S1 performed by Ryan Amaro MD

## 2025-02-25 NOTE — DISCHARGE INSTRUCTIONS
Return the emergency department if you have worsening pain, vomiting, unable to keep down food or drink, fever, black or bloody stool, or any other new or concerning symptoms.    Follow-up with your primary care physician and general surgery at the next available appointment.

## 2025-02-25 NOTE — CONSULTS
GENERAL SURGERY  CONSULT NOTE    Patient's Name/Date of Birth: Wenceslao Hu / 1970    Date: February 25, 2025     PCP: Siva Cottrell MD     Chief Complaint:   Chief Complaint   Patient presents with    Abdominal Pain    Vomiting     Patient c/o vomiting and abdominal pain since Friday .        Physician Consulted: Dr. Medina  Reason for Consult: cholecystitis  Referring Physician: Dr. Janice PINTO  Wenceslao Hu is a 54 y.o. male who presents for evaluation of abdominal pain, nausea, vomiting for the past 3 days.  Patient reports that pain was intermittent and then became worse today as well as persistent.  He reports the pain is localized to his epigastrium and right upper quadrant.  He has not had pain like this in the past.  He denies any recent travel or new foods.  No blood thinning medications.  He has had a prior hernia repair with this was completed in [place] several years ago.  No blood thinners.  No alcohol abuse.  No significant NSAID use.    General surgery consulted for concern for cholecystitis.  Right upper quadrant ultrasound shows gallbladder wall thickening with sludge and cholelithiasis.  Negative Mcmillan sign.  No pericholecystic fluid.  CBD 4.5 mm.  Labs reviewed and overall within normal limits.  Patient is afebrile and hemodynamically stable.      Past Medical History:   Diagnosis Date    Adrenal adenoma     Arthritis     Asthma     Back pain     COPD (chronic obstructive pulmonary disease) (HCC)     Diabetes mellitus (HCC)     Family history of breast cancer     Hypertension     Morbid obesity     Mucopurulent chronic bronchitis (HCC) 03/19/2018    Obstructive sleep apnea syndrome 03/19/2018    Pulmonary nodule 03/19/2018    Sleep apnea     Cpap 15 cm BMS    Smoker        Past Surgical History:   Procedure Laterality Date    CYST REMOVAL      HERNIA REPAIR      PAIN MANAGEMENT PROCEDURE N/A 5/4/2023    LUMBAR EPIDURAL STEROID INJECTION UNDER FLUOROSCOPIC GUIDANCE AT L5-S1 performed by

## 2025-03-26 ENCOUNTER — TELEPHONE (OUTPATIENT)
Dept: SURGERY | Age: 55
End: 2025-03-26

## 2025-03-26 ENCOUNTER — PREP FOR PROCEDURE (OUTPATIENT)
Dept: SURGERY | Age: 55
End: 2025-03-26

## 2025-03-26 ENCOUNTER — OFFICE VISIT (OUTPATIENT)
Dept: SURGERY | Age: 55
End: 2025-03-26
Payer: MEDICARE

## 2025-03-26 VITALS
HEART RATE: 83 BPM | RESPIRATION RATE: 20 BRPM | TEMPERATURE: 98.2 F | OXYGEN SATURATION: 93 % | WEIGHT: 293.8 LBS | DIASTOLIC BLOOD PRESSURE: 74 MMHG | HEIGHT: 71 IN | BODY MASS INDEX: 41.13 KG/M2 | SYSTOLIC BLOOD PRESSURE: 131 MMHG

## 2025-03-26 DIAGNOSIS — R10.84 GENERALIZED ABDOMINAL PAIN: Primary | ICD-10-CM

## 2025-03-26 PROBLEM — R19.7 DIARRHEA: Status: ACTIVE | Noted: 2025-03-26

## 2025-03-26 PROBLEM — R10.9 ABDOMINAL PAIN: Status: ACTIVE | Noted: 2025-03-26

## 2025-03-26 PROCEDURE — 99204 OFFICE O/P NEW MOD 45 MIN: CPT | Performed by: SURGERY

## 2025-03-26 PROCEDURE — 99212 OFFICE O/P EST SF 10 MIN: CPT | Performed by: SURGERY

## 2025-03-26 RX ORDER — BISACODYL 5 MG
5 TABLET, DELAYED RELEASE (ENTERIC COATED) ORAL ONCE
Qty: 8 TABLET | Refills: 0 | Status: SHIPPED | OUTPATIENT
Start: 2025-03-26 | End: 2025-03-26

## 2025-03-26 RX ORDER — TIRZEPATIDE 5 MG/.5ML
INJECTION, SOLUTION SUBCUTANEOUS
COMMUNITY
Start: 2025-01-31

## 2025-03-26 RX ORDER — PANTOPRAZOLE SODIUM 40 MG/1
40 TABLET, DELAYED RELEASE ORAL
Qty: 180 TABLET | Refills: 1 | Status: SHIPPED | OUTPATIENT
Start: 2025-03-26

## 2025-03-26 RX ORDER — POLYETHYLENE GLYCOL 3350 17 G/17G
238 POWDER, FOR SOLUTION ORAL ONCE
Qty: 255 G | Refills: 0 | Status: SHIPPED | OUTPATIENT
Start: 2025-03-26 | End: 2025-03-26

## 2025-03-26 RX ORDER — SUCRALFATE 1 G/1
1 TABLET ORAL 4 TIMES DAILY
Qty: 120 TABLET | Refills: 3 | Status: SHIPPED | OUTPATIENT
Start: 2025-03-26

## 2025-03-26 NOTE — PATIENT INSTRUCTIONS
[facility]  MIRALAX/DULCOLAX TABLETS  COLON PREP FOR COLONOSCOPY OR COLON SURGERY    It is very important that you follow all of the instructions listed on this sheet carefully (they may be slightly different than the directions on the product that you purchase at the pharmacy) to ensure that your colon is adequately cleaned out or your risk of complications could be increased.    2 Days or More Before Endoscopy:    Start clear liquids 2 days before procedure.    · Obtain 2-Miralax powder 238 gm (8.3 oz) bottle and 64 oz of Gatorade from the pharmacy.    · Obtain bottle of Dulcolax tablets    · Do not eat corn, tomatoes, peas or watermelon 3 to 5 days before procedure.    · Two days before the colonoscopy, mix the entire bottles of Miralax in the 64 oz of Gatorade put in the refrigerator to get cold    · If you are on INSULIN or OTHER DIABETIC MEDICATIONS, then check with your primary care physician as to how to adjust your medication while on clear liquid diet and when nothing by mouth.    1 Day Before the Endoscopy:    · No solid food - only clear liquids (soup, jello, or juice that you can see through with no solid food) for breakfast, lunch and supper. DO NOT drink or eat anything that is red as it will turn the inside of the colon red and look like blood. Nothing to eat or drink after midnight.    · Have at least 8 oz or more of clear liquids for breakfast (7 am to 8 am) and lunch (11:30 am to 12:30 pm).    · 12 Noon Take 4 Dulcolax tablets followed immediately by at least 8 oz of clear liquids.    · 1:00 pm Drink at least 8 oz of clear liquids.    · 2:00 pm Take 4 Dulcolax tablets and drink at least 8 oz of clear liquids.    · 3:00 pm Drink at least 8 oz of clear liquids.    · 4:00 pm Drink the Gatorade with Miralax powder 8 oz every 30 minutes    · Can continue to take liquids until midnight    Day of Endoscopy:    Nothing to eat or drink until after the procedure    If any blood

## 2025-03-26 NOTE — H&P (VIEW-ONLY)
Wenceslao Hu  3/26/2025  [facility]              History and Physical      Chief Complaint   Patient presents with    Abdominal Pain     ER f/u for cholecystitis, pt denies pain c/o discomfort, pt denies current nausea/vomiting,     Diarrhea     Pt c/o diarrhea going several times a day worsens after meals, pt states he occasionally sees blood in his stool last occurrence being after coming home from hospital. Pt states he has been having black stools. Pt denies abd pain/bloating. Pt states he was dealing with constipation up until having issues with his gallbladder and the diarrhea began          HISTORY OF PRESENT ILLNESS: Wenceslao Hu is a  54 y.o.  male,  here for a consult for diarrhea and blood in his stools. He can not pinpoint what kind of foods make him have the diarrhea except for meat. No unexpected weight loss. No nausea or vomiting at this time. No family Hx colon or gastric cancer . Quit smoking in 2017 social drinker . CBC BMP Lipase LFTs unremarkable , US personally reviewed and interpreted Cholelithiasis      Past Medical History:   Diagnosis Date    Adrenal adenoma     Arthritis     Asthma     Back pain     COPD (chronic obstructive pulmonary disease) (HCC)     Diabetes mellitus (HCC)     Family history of breast cancer     Hypertension     Morbid obesity     Mucopurulent chronic bronchitis (HCC) 03/19/2018    Obstructive sleep apnea syndrome 03/19/2018    Pulmonary nodule 03/19/2018    Sleep apnea     Cpap 15 cm BMS    Smoker      Past Surgical History:   Procedure Laterality Date    CYST REMOVAL      HERNIA REPAIR      PAIN MANAGEMENT PROCEDURE N/A 5/4/2023    LUMBAR EPIDURAL STEROID INJECTION UNDER FLUOROSCOPIC GUIDANCE AT L5-S1 performed by Ryan Amaro MD at [facility]     Allergies   Allergen Reactions    Amlodipine     Codeine     Metformin     Naproxen     Penicillins      Current Outpatient Medications on File Prior to Visit   Medication Sig

## 2025-03-26 NOTE — TELEPHONE ENCOUNTER
LPN scheduled patient for EGD/Colonoscopy on 4/21/25, at [facility]. Patient confirmed date and time. LPN went over prep instructions with patient in office, as well as being provided them in AVS. RX for bowel prep was sent to patients preferred pharmacy.     Electronically signed by Aleisha Bourgeois LPN on 3/26/25 at 2:48 PM EDT

## 2025-03-26 NOTE — PROGRESS NOTES
Age of Onset    Hypertension Mother     Coronary Art Dis Mother     Cancer Mother     Cancer Sister     Diabetes Sister          Pertinent ROS above       Physical Exam  HENT:      Head: Normocephalic.   Eyes:      Pupils: Pupils are equal, round, and reactive to light.   Cardiovascular:      Rate and Rhythm: Normal rate.   Pulmonary:      Effort: Pulmonary effort is normal.   Abdominal:      General: Abdomen is flat. There is no distension.      Tenderness: There is no abdominal tenderness.   Musculoskeletal:         General: Normal range of motion.      Cervical back: Neck supple.   Skin:     General: Skin is warm.   Neurological:      General: No focal deficit present.      Mental Status: He is alert and oriented to person, place, and time.   Psychiatric:         Mood and Affect: Mood normal.         Assessment/Plan   Diarrhea and blood in the stool.     Diagnostic EGD     I have discussed the risks, benefits, and alternatives to esophagogastroduodenoscopy with possible biopsy with deep sedation with the patient. I have detailed the risks of deep sedation (hypotension, hypoxia) as well as complications of bleeding and perforation.  The patient understands the above and agrees to proceed    Diagnostic Colonoscopy     I discussed the risks, benefits, and alternatives to colonoscopy with possible biopsy/cauterization/polylpectomy with deep sedation with the patient including the risks of deep sedation (hypotension, hypoxia), bleeding, and perforation (<1%).  The patient understands the above and agrees to proceed.      If above negative could be symptomatic cholelithiasis     Physician Signature: Nataly Benson MD      Send copy of H&P to PCP,Siva Cottrell MD

## 2025-04-15 NOTE — PROGRESS NOTES
[facility] PRE-ADMISSION TESTING   COLONOSCOPY INSTRUCTIONS  PAT- Phone Number: [phone]    COLONOSCOPY INSTRUCTIONS:     [x] Bowel Prep instructions reviewed - any questions regarding your prep, please contact surgeon's office.  [x] Colonoscopy: 1-2 days prior: Clear liquids only - nothing red or purple in color.  [x] Antibacterial Soap Shower Night before AND the morning of procedure.  [x] No solid food after midnight. You may have SIPS of clear liquids up until 2 hours before your arrival time to the hospital.   [x] Do not wear makeup, lotions, powders, deodorant.   [x] No tobacco products, illegal drugs, or alcohol within 24 hours of your surgery.  [x] Jewelry or valuables should not be brought to the hospital. All body and/or tongue piercing's must be removed prior to arriving to hospital. No contact lens or hair pins.   [x] Arrange transportation with a responsible adult  to and from the hospital. If you do not have a responsible adult  to transport you, you will need to make arrangements with a medical transportation company. Arrange for someone to be with you for the remainder of the day and for 24 hours after your procedure due to having had anesthesia.    -Who will be your  for transportation? daughter  -Who will be staying with you for 24 hrs after your procedure? daughter  [x] Bring insurance card and photo ID.    PARKING INSTRUCTIONS:     [x] ARRIVAL DATE & TIME: 4/21 at 0800  [x] Times are subject to change. We will contact you the business day before surgery if that were to occur.  [x] Enter into the [facility] Entrance. Two people may accompany you. Masks are not required.  [x] Parking Lot \"I\" is where you will park. It is located on the corner of [address] and [address]. The entrance is on [address].   Only one vehicle - per patient, is permitted in parking lot.   Upon entering the parking lot, a voucher ticket will

## 2025-04-21 ENCOUNTER — ANESTHESIA EVENT (OUTPATIENT)
Dept: ENDOSCOPY | Age: 55
End: 2025-04-21
Payer: MEDICARE

## 2025-04-21 ENCOUNTER — ANESTHESIA (OUTPATIENT)
Dept: ENDOSCOPY | Age: 55
End: 2025-04-21
Payer: MEDICARE

## 2025-04-21 ENCOUNTER — HOSPITAL ENCOUNTER (OUTPATIENT)
Age: 55
Setting detail: OUTPATIENT SURGERY
Discharge: HOME | End: 2025-04-21
Attending: SURGERY | Admitting: SURGERY
Payer: MEDICARE

## 2025-04-21 VITALS
RESPIRATION RATE: 16 BRPM | SYSTOLIC BLOOD PRESSURE: 120 MMHG | DIASTOLIC BLOOD PRESSURE: 92 MMHG | OXYGEN SATURATION: 98 % | TEMPERATURE: 97.1 F | BODY MASS INDEX: 41.95 KG/M2 | HEART RATE: 70 BPM | WEIGHT: 293 LBS | HEIGHT: 70 IN

## 2025-04-21 DIAGNOSIS — Z01.812 PRE-OPERATIVE LABORATORY EXAMINATION: Primary | ICD-10-CM

## 2025-04-21 DIAGNOSIS — R19.7 DIARRHEA: ICD-10-CM

## 2025-04-21 DIAGNOSIS — R10.9 ABDOMINAL PAIN: ICD-10-CM

## 2025-04-21 LAB — GLUCOSE BLD-MCNC: 100 MG/DL (ref 74–99)

## 2025-04-21 PROCEDURE — 6370000000 HC RX 637 (ALT 250 FOR IP): Performed by: SURGERY

## 2025-04-21 PROCEDURE — 6360000002 HC RX W HCPCS: Performed by: ANESTHESIOLOGIST ASSISTANT

## 2025-04-21 PROCEDURE — 2580000003 HC RX 258: Performed by: SURGERY

## 2025-04-21 PROCEDURE — 82962 GLUCOSE BLOOD TEST: CPT

## 2025-04-21 PROCEDURE — 7100000011 HC PHASE II RECOVERY - ADDTL 15 MIN: Performed by: SURGERY

## 2025-04-21 PROCEDURE — 45380 COLONOSCOPY AND BIOPSY: CPT | Performed by: SURGERY

## 2025-04-21 PROCEDURE — 88342 IMHCHEM/IMCYTCHM 1ST ANTB: CPT

## 2025-04-21 PROCEDURE — 7100000010 HC PHASE II RECOVERY - FIRST 15 MIN: Performed by: SURGERY

## 2025-04-21 PROCEDURE — 43239 EGD BIOPSY SINGLE/MULTIPLE: CPT | Performed by: SURGERY

## 2025-04-21 PROCEDURE — 88305 TISSUE EXAM BY PATHOLOGIST: CPT

## 2025-04-21 PROCEDURE — 3700000000 HC ANESTHESIA ATTENDED CARE: Performed by: SURGERY

## 2025-04-21 PROCEDURE — 6360000002 HC RX W HCPCS

## 2025-04-21 PROCEDURE — 3609010300 HC COLONOSCOPY W/BIOPSY SINGLE/MULTIPLE: Performed by: SURGERY

## 2025-04-21 PROCEDURE — 3609012400 HC EGD TRANSORAL BIOPSY SINGLE/MULTIPLE: Performed by: SURGERY

## 2025-04-21 PROCEDURE — 3700000001 HC ADD 15 MINUTES (ANESTHESIA): Performed by: SURGERY

## 2025-04-21 PROCEDURE — 2709999900 HC NON-CHARGEABLE SUPPLY: Performed by: SURGERY

## 2025-04-21 RX ORDER — PROPOFOL 10 MG/ML
INJECTION, EMULSION INTRAVENOUS
Status: DISCONTINUED | OUTPATIENT
Start: 2025-04-21 | End: 2025-04-21 | Stop reason: SDUPTHER

## 2025-04-21 RX ORDER — FENTANYL CITRATE 50 UG/ML
INJECTION, SOLUTION INTRAMUSCULAR; INTRAVENOUS
Status: DISCONTINUED | OUTPATIENT
Start: 2025-04-21 | End: 2025-04-21 | Stop reason: SDUPTHER

## 2025-04-21 RX ORDER — SODIUM CHLORIDE 9 MG/ML
INJECTION, SOLUTION INTRAVENOUS CONTINUOUS
Status: DISCONTINUED | OUTPATIENT
Start: 2025-04-21 | End: 2025-04-21 | Stop reason: HOSPADM

## 2025-04-21 RX ORDER — SODIUM CHLORIDE 0.9 % (FLUSH) 0.9 %
10 SYRINGE (ML) INJECTION PRN
Status: DISCONTINUED | OUTPATIENT
Start: 2025-04-21 | End: 2025-04-21 | Stop reason: HOSPADM

## 2025-04-21 RX ORDER — SODIUM CHLORIDE 0.9 % (FLUSH) 0.9 %
10 SYRINGE (ML) INJECTION EVERY 12 HOURS SCHEDULED
Status: DISCONTINUED | OUTPATIENT
Start: 2025-04-21 | End: 2025-04-21 | Stop reason: HOSPADM

## 2025-04-21 RX ORDER — SIMETHICONE 40MG/0.6ML
SUSPENSION, DROPS(FINAL DOSAGE FORM)(ML) ORAL PRN
Status: DISCONTINUED | OUTPATIENT
Start: 2025-04-21 | End: 2025-04-21 | Stop reason: ALTCHOICE

## 2025-04-21 RX ORDER — LIDOCAINE HYDROCHLORIDE 20 MG/ML
INJECTION, SOLUTION INTRAVENOUS
Status: DISCONTINUED | OUTPATIENT
Start: 2025-04-21 | End: 2025-04-21 | Stop reason: SDUPTHER

## 2025-04-21 RX ORDER — MIDAZOLAM HYDROCHLORIDE 1 MG/ML
INJECTION, SOLUTION INTRAMUSCULAR; INTRAVENOUS
Status: DISCONTINUED | OUTPATIENT
Start: 2025-04-21 | End: 2025-04-21 | Stop reason: SDUPTHER

## 2025-04-21 RX ORDER — PHENYLEPHRINE HCL IN 0.9% NACL 1 MG/10 ML
SYRINGE (ML) INTRAVENOUS
Status: DISCONTINUED | OUTPATIENT
Start: 2025-04-21 | End: 2025-04-21 | Stop reason: SDUPTHER

## 2025-04-21 RX ORDER — SODIUM CHLORIDE 9 MG/ML
INJECTION, SOLUTION INTRAVENOUS PRN
Status: DISCONTINUED | OUTPATIENT
Start: 2025-04-21 | End: 2025-04-21 | Stop reason: HOSPADM

## 2025-04-21 RX ADMIN — MIDAZOLAM 2 MG: 1 INJECTION INTRAMUSCULAR; INTRAVENOUS at 09:17

## 2025-04-21 RX ADMIN — PROPOFOL 150 MCG/KG/MIN: 10 INJECTION, EMULSION INTRAVENOUS at 09:18

## 2025-04-21 RX ADMIN — Medication 50 MCG: at 09:40

## 2025-04-21 RX ADMIN — FENTANYL CITRATE 50 MCG: 50 INJECTION, SOLUTION INTRAMUSCULAR; INTRAVENOUS at 09:22

## 2025-04-21 RX ADMIN — SODIUM CHLORIDE: 0.9 INJECTION, SOLUTION INTRAVENOUS at 08:47

## 2025-04-21 RX ADMIN — LIDOCAINE HYDROCHLORIDE 80 MG: 20 INJECTION, SOLUTION INTRAVENOUS at 09:17

## 2025-04-21 RX ADMIN — Medication 100 MCG: at 09:46

## 2025-04-21 RX ADMIN — FENTANYL CITRATE 50 MCG: 50 INJECTION, SOLUTION INTRAMUSCULAR; INTRAVENOUS at 09:17

## 2025-04-21 RX ADMIN — Medication 100 MCG: at 09:52

## 2025-04-21 RX ADMIN — PROPOFOL 40 MG: 10 INJECTION, EMULSION INTRAVENOUS at 09:17

## 2025-04-21 ASSESSMENT — PAIN - FUNCTIONAL ASSESSMENT
PAIN_FUNCTIONAL_ASSESSMENT: NONE - DENIES PAIN
PAIN_FUNCTIONAL_ASSESSMENT: 0-10

## 2025-04-21 NOTE — BRIEF OP NOTE
Brief Postoperative Note      Patient: Wenceslao Hu  YOB: 1970  MRN: [id number]    Date of Procedure: 4/21/2025    Pre-Op Diagnosis Codes:      * Diarrhea [R19.7]     * Abdominal pain [R10.9]  Rectal bleeding    Post-Op Diagnosis: Same, duodenal polyps, hyperplastic distal sigmoid colon polyp       Procedures:  Esophagogastroduodenoscopy with biopsy of duodenal polyps  Complete colonoscopy with intubation of the terminal ileum.  Random biopsies    Surgeon(s):  Nataly Benson MD    Assistant:  Resident: Segun Sierra DO    Anesthesia: Monitor Anesthesia Care    Estimated Blood Loss (mL): Minimal    Complications: None    Specimens:   ID Type Source Tests Collected by Time Destination   A : Duodenal polyp - EGD Gastric Gastric SURGICAL PATHOLOGY Nataly Benson MD 4/21/2025 0922    B : Antral BX - R/O H Pylori Gastric Gastric SURGICAL PATHOLOGY Nataly Benson MD 4/21/2025 0928    C : Colon - Random bx Tissue Colon SURGICAL PATHOLOGY Nataly Benson MD 4/21/2025 0954    D : Terminal ileum bx Tissue Colon SURGICAL PATHOLOGY Nataly Benson MD 4/21/2025 1003        Implants:  * No implants in log *      Drains: * No LDAs found *    Findings:  Infection Present At Time Of Surgery (PATOS) (choose all levels that have infection present):  No infection present  Other Findings: duodenal polyps, possible hyperplastic distal sigmoid colon polyp attempted to find again  for several minutes and unable to could have been a suction polyp     Electronically signed by Segun Sierra DO on 4/21/2025 at 10:30 AM    [facility]   Attending Physician Statement:  I was present during the entire procedure and was actively supervising and directing the resident. There were no immediate complications.    Nataly Benson MD

## 2025-04-21 NOTE — INTERVAL H&P NOTE
Update History & Physical    The patient's History and Physical of March 26, 2025 was reviewed with the patient and I examined the patient. There was no change. The surgical site was confirmed by the patient and me.     Plan: The risks, benefits, expected outcome, and alternative to the recommended procedure have been discussed with the patient. Patient understands and wants to proceed with the procedure.     Electronically signed by Segun Sierra DO on 4/21/2025 at 8:12 AM

## 2025-04-21 NOTE — ANESTHESIA PRE PROCEDURE
Department of Anesthesiology  Preprocedure Note       Name:  Wenceslao Hu   Age:  54 y.o.  :  1970                                          MRN:  [id number]         Date:  2025      Surgeon: Surgeon(s):  Nataly Benson MD    Procedure: Procedure(s):  ESOPHAGOGASTRODUODENOSCOPY BIOPSY  COLONOSCOPY DIAGNOSTIC    Medications prior to admission:   Prior to Admission medications    Medication Sig Start Date End Date Taking? Authorizing Provider   hydrALAZINE (APRESOLINE) 25 MG tablet Take 1 tablet by mouth 2 times daily 24  Yes Avila Comer DO   losartan (COZAAR) 100 MG tablet Take 1 tablet by mouth daily 24  Yes Avila Comer DO   hydroCHLOROthiazide (HYDRODIURIL) 25 MG tablet Take 1 tablet by mouth daily 24  Yes Avila Comer DO   fluticasone-umeclidin-vilant (TRELEGY ELLIPTA) 100-62.5-25 MCG/ACT AEPB inhaler Inhale 1 puff into the lungs daily 24  Yes Avila Comer DO   albuterol sulfate HFA (VENTOLIN HFA) 108 (90 Base) MCG/ACT inhaler Inhale 2 puffs into the lungs 4 times daily as needed for Wheezing 24  Yes Avila Comer DO   albuterol (PROVENTIL) (2.5 MG/3ML) 0.083% nebulizer solution Take 3 mLs by nebulization 4 times daily as needed for Wheezing 24  Yes Avila Comer DO   montelukast (SINGULAIR) 10 MG tablet Take 1 tablet by mouth nightly 23  Yes Avila Comer DO   MOUNJARO 5 MG/0.5ML SOAJ  25   Provider, MD Eli   pantoprazole (PROTONIX) 40 MG tablet Take 1 tablet by mouth 2 times daily (before meals)  Patient not taking: Reported on 2025 3/26/25   Nataly Benson MD   sucralfate (CARAFATE) 1 GM tablet Take 1 tablet by mouth 4 times daily  Patient not taking: Reported on 2025 3/26/25   Nataly Benson MD   ondansetron (ZOFRAN-ODT) 4 MG disintegrating tablet Take 1 tablet by mouth 3 times daily as needed for Nausea or Vomiting  Patient not taking: Reported on 2025

## 2025-04-22 NOTE — ANESTHESIA POSTPROCEDURE EVALUATION
Department of Anesthesiology  Postprocedure Note    Patient: Wenceslao Hu  MRN: [id number]  YOB: 1970  Date of evaluation: 4/21/2025    Procedure Summary       Date: 04/21/25 Room / Location: [place] [address] / [facility]    Anesthesia Start: 0909 Anesthesia Stop: 1040    Procedures:       ESOPHAGOGASTRODUODENOSCOPY BIOPSY      COLONOSCOPY BIOPSY Diagnosis:       Diarrhea      Abdominal pain      (Diarrhea [R19.7])      (Abdominal pain [R10.9])    Surgeons: Nataly Benson MD Responsible Provider: Tejas Acosta DO    Anesthesia Type: MAC ASA Status: 3            Anesthesia Type: MAC    Kiersten Phase I: Kiersten Score: 10    Kiersten Phase II: Kiersten Score: 9    Anesthesia Post Evaluation    Patient location during evaluation: PACU  Patient participation: complete - patient participated  Level of consciousness: awake and alert  Pain score: 1  Airway patency: patent  Nausea & Vomiting: no nausea and no vomiting  Cardiovascular status: hemodynamically stable  Respiratory status: acceptable  Hydration status: euvolemic  Pain management: adequate    No notable events documented.

## 2025-04-29 LAB — SURGICAL PATHOLOGY REPORT: NORMAL

## 2025-05-21 PROBLEM — Z01.812 PRE-OPERATIVE LABORATORY EXAMINATION: Status: RESOLVED | Noted: 2025-04-21 | Resolved: 2025-05-21

## 2025-06-05 ENCOUNTER — RESULTS FOLLOW-UP (OUTPATIENT)
Age: 55
End: 2025-06-05

## 2025-06-05 NOTE — TELEPHONE ENCOUNTER
LPN called to inform patient of colonoscopy results per Dr Benson \"All Biopsies negative if still having pain likely symptomatic cholelithiasis and recommend cholecystectomy. Can have him come to the office to discuss more or set up. His preference \"    Per patient, no longer having any pain at all. Instructed patient to call office back if pain comes back.    Electronically signed by Aleisha Bourgeois LPN on 6/5/25 at 8:26 AM EDT

## 2025-07-19 ENCOUNTER — APPOINTMENT (OUTPATIENT)
Dept: ULTRASOUND IMAGING | Age: 55
DRG: 418 | End: 2025-07-19
Payer: MEDICARE

## 2025-07-19 ENCOUNTER — APPOINTMENT (OUTPATIENT)
Dept: CT IMAGING | Age: 55
DRG: 418 | End: 2025-07-19
Payer: MEDICARE

## 2025-07-19 ENCOUNTER — HOSPITAL ENCOUNTER (INPATIENT)
Age: 55
LOS: 2 days | Discharge: HOME | DRG: 418 | End: 2025-07-21
Attending: STUDENT IN AN ORGANIZED HEALTH CARE EDUCATION/TRAINING PROGRAM | Admitting: SURGERY
Payer: MEDICARE

## 2025-07-19 DIAGNOSIS — K80.20 SYMPTOMATIC CHOLELITHIASIS: Primary | ICD-10-CM

## 2025-07-19 DIAGNOSIS — K81.0 ACUTE CHOLECYSTITIS: ICD-10-CM

## 2025-07-19 DIAGNOSIS — K81.1 CHRONIC CHOLECYSTITIS: ICD-10-CM

## 2025-07-19 LAB
ABO + RH BLD: NORMAL
ALBUMIN SERPL-MCNC: 3.8 G/DL (ref 3.5–5.2)
ALP SERPL-CCNC: 59 U/L (ref 40–129)
ALT SERPL-CCNC: 13 U/L (ref 0–50)
ANION GAP SERPL CALCULATED.3IONS-SCNC: 11 MMOL/L (ref 7–16)
ARM BAND NUMBER: NORMAL
AST SERPL-CCNC: 22 U/L (ref 0–50)
BASOPHILS # BLD: 0.01 K/UL (ref 0–0.2)
BASOPHILS NFR BLD: 0 % (ref 0–2)
BILIRUB DIRECT SERPL-MCNC: 0.1 MG/DL (ref 0–0.2)
BILIRUB INDIRECT SERPL-MCNC: 0.2 MG/DL (ref 0–1)
BILIRUB SERPL-MCNC: 0.3 MG/DL (ref 0–1.2)
BLOOD BANK SAMPLE EXPIRATION: NORMAL
BLOOD GROUP ANTIBODIES SERPL: NEGATIVE
BUN SERPL-MCNC: 13 MG/DL (ref 6–20)
CALCIUM SERPL-MCNC: 9 MG/DL (ref 8.6–10)
CHLORIDE SERPL-SCNC: 105 MMOL/L (ref 98–107)
CO2 SERPL-SCNC: 24 MMOL/L (ref 22–29)
CREAT SERPL-MCNC: 1 MG/DL (ref 0.7–1.2)
EOSINOPHIL # BLD: 0 K/UL (ref 0.05–0.5)
EOSINOPHILS RELATIVE PERCENT: 0 % (ref 0–6)
ERYTHROCYTE [DISTWIDTH] IN BLOOD BY AUTOMATED COUNT: 14.8 % (ref 11.5–15)
GFR, ESTIMATED: >90 ML/MIN/1.73M2
GLUCOSE SERPL-MCNC: 127 MG/DL (ref 74–99)
HCT VFR BLD AUTO: 44 % (ref 37–54)
HGB BLD-MCNC: 14.7 G/DL (ref 12.5–16.5)
IMM GRANULOCYTES # BLD AUTO: <0.03 K/UL (ref 0–0.58)
IMM GRANULOCYTES NFR BLD: 0 % (ref 0–5)
LACTATE BLDV-SCNC: 1.2 MMOL/L (ref 0.5–2.2)
LIPASE SERPL-CCNC: 21 U/L (ref 13–60)
LYMPHOCYTES NFR BLD: 1.3 K/UL (ref 1.5–4)
LYMPHOCYTES RELATIVE PERCENT: 17 % (ref 20–42)
MCH RBC QN AUTO: 30.7 PG (ref 26–35)
MCHC RBC AUTO-ENTMCNC: 33.4 G/DL (ref 32–34.5)
MCV RBC AUTO: 91.9 FL (ref 80–99.9)
MONOCYTES NFR BLD: 0.5 K/UL (ref 0.1–0.95)
MONOCYTES NFR BLD: 6 % (ref 2–12)
NEUTROPHILS NFR BLD: 77 % (ref 43–80)
NEUTS SEG NFR BLD: 6 K/UL (ref 1.8–7.3)
PLATELET # BLD AUTO: 203 K/UL (ref 130–450)
PMV BLD AUTO: 10.4 FL (ref 7–12)
POTASSIUM SERPL-SCNC: 3.9 MMOL/L (ref 3.5–5.1)
PROT SERPL-MCNC: 7.1 G/DL (ref 6.4–8.3)
RBC # BLD AUTO: 4.79 M/UL (ref 3.8–5.8)
SODIUM SERPL-SCNC: 140 MMOL/L (ref 136–145)
WBC OTHER # BLD: 7.8 K/UL (ref 4.5–11.5)

## 2025-07-19 PROCEDURE — 83690 ASSAY OF LIPASE: CPT

## 2025-07-19 PROCEDURE — 6360000002 HC RX W HCPCS

## 2025-07-19 PROCEDURE — 6360000002 HC RX W HCPCS: Performed by: STUDENT IN AN ORGANIZED HEALTH CARE EDUCATION/TRAINING PROGRAM

## 2025-07-19 PROCEDURE — 1200000000 HC SEMI PRIVATE

## 2025-07-19 PROCEDURE — 96374 THER/PROPH/DIAG INJ IV PUSH: CPT

## 2025-07-19 PROCEDURE — 99285 EMERGENCY DEPT VISIT HI MDM: CPT

## 2025-07-19 PROCEDURE — 80053 COMPREHEN METABOLIC PANEL: CPT

## 2025-07-19 PROCEDURE — 96375 TX/PRO/DX INJ NEW DRUG ADDON: CPT

## 2025-07-19 PROCEDURE — 2500000003 HC RX 250 WO HCPCS

## 2025-07-19 PROCEDURE — 2580000003 HC RX 258

## 2025-07-19 PROCEDURE — 76705 ECHO EXAM OF ABDOMEN: CPT

## 2025-07-19 PROCEDURE — 86850 RBC ANTIBODY SCREEN: CPT

## 2025-07-19 PROCEDURE — 6360000004 HC RX CONTRAST MEDICATION: Performed by: RADIOLOGY

## 2025-07-19 PROCEDURE — 74177 CT ABD & PELVIS W/CONTRAST: CPT

## 2025-07-19 PROCEDURE — 86900 BLOOD TYPING SEROLOGIC ABO: CPT

## 2025-07-19 PROCEDURE — 86901 BLOOD TYPING SEROLOGIC RH(D): CPT

## 2025-07-19 PROCEDURE — 85025 COMPLETE CBC W/AUTO DIFF WBC: CPT

## 2025-07-19 PROCEDURE — 82248 BILIRUBIN DIRECT: CPT

## 2025-07-19 PROCEDURE — 83605 ASSAY OF LACTIC ACID: CPT

## 2025-07-19 PROCEDURE — 2580000003 HC RX 258: Performed by: STUDENT IN AN ORGANIZED HEALTH CARE EDUCATION/TRAINING PROGRAM

## 2025-07-19 RX ORDER — ONDANSETRON 2 MG/ML
4 INJECTION INTRAMUSCULAR; INTRAVENOUS EVERY 6 HOURS PRN
Status: DISCONTINUED | OUTPATIENT
Start: 2025-07-19 | End: 2025-07-21 | Stop reason: HOSPADM

## 2025-07-19 RX ORDER — METRONIDAZOLE 500 MG/100ML
500 INJECTION, SOLUTION INTRAVENOUS EVERY 8 HOURS
Status: DISCONTINUED | OUTPATIENT
Start: 2025-07-19 | End: 2025-07-20

## 2025-07-19 RX ORDER — SODIUM CHLORIDE 9 MG/ML
INJECTION, SOLUTION INTRAVENOUS PRN
Status: DISCONTINUED | OUTPATIENT
Start: 2025-07-19 | End: 2025-07-21 | Stop reason: HOSPADM

## 2025-07-19 RX ORDER — 0.9 % SODIUM CHLORIDE 0.9 %
1000 INTRAVENOUS SOLUTION INTRAVENOUS ONCE
Status: COMPLETED | OUTPATIENT
Start: 2025-07-19 | End: 2025-07-19

## 2025-07-19 RX ORDER — SODIUM CHLORIDE, SODIUM LACTATE, POTASSIUM CHLORIDE, CALCIUM CHLORIDE 600; 310; 30; 20 MG/100ML; MG/100ML; MG/100ML; MG/100ML
INJECTION, SOLUTION INTRAVENOUS CONTINUOUS
Status: DISCONTINUED | OUTPATIENT
Start: 2025-07-19 | End: 2025-07-20

## 2025-07-19 RX ORDER — SODIUM CHLORIDE 0.9 % (FLUSH) 0.9 %
10 SYRINGE (ML) INJECTION EVERY 12 HOURS SCHEDULED
Status: DISCONTINUED | OUTPATIENT
Start: 2025-07-19 | End: 2025-07-21 | Stop reason: HOSPADM

## 2025-07-19 RX ORDER — IOPAMIDOL 755 MG/ML
75 INJECTION, SOLUTION INTRAVASCULAR
Status: COMPLETED | OUTPATIENT
Start: 2025-07-19 | End: 2025-07-19

## 2025-07-19 RX ORDER — ONDANSETRON 4 MG/1
4 TABLET, ORALLY DISINTEGRATING ORAL EVERY 8 HOURS PRN
Status: DISCONTINUED | OUTPATIENT
Start: 2025-07-19 | End: 2025-07-21 | Stop reason: HOSPADM

## 2025-07-19 RX ORDER — PROCHLORPERAZINE EDISYLATE 5 MG/ML
10 INJECTION INTRAMUSCULAR; INTRAVENOUS ONCE
Status: COMPLETED | OUTPATIENT
Start: 2025-07-19 | End: 2025-07-19

## 2025-07-19 RX ORDER — SODIUM CHLORIDE 0.9 % (FLUSH) 0.9 %
10 SYRINGE (ML) INJECTION PRN
Status: DISCONTINUED | OUTPATIENT
Start: 2025-07-19 | End: 2025-07-21 | Stop reason: HOSPADM

## 2025-07-19 RX ORDER — FENTANYL CITRATE 50 UG/ML
50 INJECTION, SOLUTION INTRAMUSCULAR; INTRAVENOUS ONCE
Status: COMPLETED | OUTPATIENT
Start: 2025-07-19 | End: 2025-07-19

## 2025-07-19 RX ADMIN — IOPAMIDOL 75 ML: 755 INJECTION, SOLUTION INTRAVENOUS at 13:28

## 2025-07-19 RX ADMIN — PROCHLORPERAZINE EDISYLATE 10 MG: 5 INJECTION INTRAMUSCULAR; INTRAVENOUS at 10:46

## 2025-07-19 RX ADMIN — METRONIDAZOLE 500 MG: 500 INJECTION, SOLUTION INTRAVENOUS at 18:06

## 2025-07-19 RX ADMIN — SODIUM CHLORIDE, SODIUM LACTATE, POTASSIUM CHLORIDE, AND CALCIUM CHLORIDE: .6; .31; .03; .02 INJECTION, SOLUTION INTRAVENOUS at 18:05

## 2025-07-19 RX ADMIN — SODIUM CHLORIDE, PRESERVATIVE FREE 10 ML: 5 INJECTION INTRAVENOUS at 21:19

## 2025-07-19 RX ADMIN — SODIUM CHLORIDE 1000 ML: 0.9 INJECTION, SOLUTION INTRAVENOUS at 10:47

## 2025-07-19 RX ADMIN — FENTANYL CITRATE 50 MCG: 50 INJECTION INTRAMUSCULAR; INTRAVENOUS at 10:46

## 2025-07-19 RX ADMIN — WATER 2000 MG: 1 INJECTION INTRAMUSCULAR; INTRAVENOUS; SUBCUTANEOUS at 17:50

## 2025-07-19 ASSESSMENT — PAIN DESCRIPTION - DESCRIPTORS: DESCRIPTORS: SHARP;ACHING;STABBING

## 2025-07-19 ASSESSMENT — PAIN DESCRIPTION - ORIENTATION: ORIENTATION: RIGHT;UPPER

## 2025-07-19 ASSESSMENT — PAIN DESCRIPTION - LOCATION: LOCATION: ABDOMEN

## 2025-07-19 ASSESSMENT — PAIN SCALES - GENERAL: PAINLEVEL_OUTOF10: 10

## 2025-07-19 ASSESSMENT — LIFESTYLE VARIABLES
HOW MANY STANDARD DRINKS CONTAINING ALCOHOL DO YOU HAVE ON A TYPICAL DAY: PATIENT DOES NOT DRINK
HOW OFTEN DO YOU HAVE A DRINK CONTAINING ALCOHOL: NEVER

## 2025-07-19 NOTE — ED NOTES
Consent signed and placed in soft chart. Pt changed into gown. Belongings in one pt belonging bag with labels including phone wallet and phone  cord.

## 2025-07-19 NOTE — ED PROVIDER NOTES
[facility]THO-[facility]AU[facility]  EMERGENCY DEPARTMENT ENCOUNTER      Pt Name: Wenceslao Hu  MRN: [id number]  Birthdate 1970  Date of evaluation: 7/19/2025  Provider: Gregor Brothers DO  PCP: Siva Cottrell MD  Note Started: 3:49 PM EDT 7/19/25    CHIEF COMPLAINT       Chief Complaint   Patient presents with    Abdominal Pain     C/O RUQ pain with emesis       HISTORY OF PRESENT ILLNESS: 1 or more Elements   History From: Patient  Limitations to history : None    Wenceslao Hu is a 54 y.o. male Past medical history of hypertension, COPD, diabetes.  Patient presents with chief complaint of right upper quadrant abdominal pain as well as some nausea and vomiting.  Patient states that for the last few days she has had gradually worsening right upper quadrant abdominal pain as well as some nausea and nonbilious nonbloody vomiting.  Symptoms have been moderate in severity constant onset 4 send palpation denies any relieving factors has had somewhat similar episodes in the past.  Patient denies any fevers, chills, chest pain, cough, headache, lightheadedness, numbness tingling weakness.    Nursing Notes were all reviewed and agreed with or any disagreements were addressed in the HPI.    REVIEW OF SYSTEMS :    Positives and Pertinent negatives as per HPI.     PAST MEDICAL HISTORY/Chronic Conditions Affecting Care    has a past medical history of Adrenal adenoma, Arthritis, Asthma, Back pain, COPD (chronic obstructive pulmonary disease) (HCC), Diabetes mellitus (HCC), Family history of breast cancer, Hypertension, Morbid obesity (HCC), Mucopurulent chronic bronchitis (HCC) (03/19/2018), Obstructive sleep apnea syndrome (03/19/2018), Pulmonary nodule (03/19/2018), Sleep apnea, and Smoker.     SURGICAL HISTORY     Past Surgical History:   Procedure Laterality Date    COLONOSCOPY N/A 4/21/2025    COLONOSCOPY BIOPSY performed by Nataly Benson MD at [facility] ENDOSCOPY    CYST REMOVAL      HERNIA REPAIR      PAIN MANAGEMENT      Social History     Tobacco Use    Smoking status: Former     Current packs/day: 0.00     Types: Cigarettes     Quit date: 2017     Years since quittin.5    Smokeless tobacco: Never   Vaping Use    Vaping status: Never Used   Substance Use Topics    Alcohol use: Yes     Alcohol/week: 2.0 standard drinks of alcohol     Types: 2 Cans of beer per week     Comment: occasional    Drug use: Yes     Types: Marijuana (Weed)     Comment: daily       SCREENINGS        Giovanni Coma Scale  Eye Opening: Spontaneous  Best Verbal Response: Oriented  Best Motor Response: Obeys commands  Giovanni Coma Scale Score: 15                CIWA Assessment  BP: (!) 158/95  Pulse: 82           PHYSICAL EXAM  1 or more Elements     ED Triage Vitals [25 1014]   BP Systolic BP Percentile Diastolic BP Percentile Temp Temp src Pulse Respirations SpO2   (!) 156/93 -- -- 98 °F (36.7 °C) -- 86 18 95 %      Height Weight - Scale         1.778 m (5' 10\") 133.4 kg (294 lb)                  Constitutional/General: Alert and oriented x3  Head: Normocephalic and atraumatic  Eyes: PERRL, EOMI, conjunctiva normal, sclera non icteric  ENT:  Oropharynx clear, handling secretions, no trismus, no asymmetry of the posterior oropharynx or uvular edema  Neck: Supple, full ROM, no stridor, no meningeal signs  Respiratory: Lungs clear to auscultation bilaterally, no wheezes, rales, or rhonchi. Not in respiratory distress  Cardiovascular:  Regular rate. Regular rhythm. No murmurs, no gallops, no rubs. 2+ distal pulses. Equal extremity pulses.   Chest: No chest wall tenderness  GI:  Abdomen Soft, mild right upper quadrant abdominal tenderness no rigidity rebound or guarding.  Positive Mcmillan sign.  Musculoskeletal: Moves all extremities x 4. Warm and well perfused, no clubbing, no cyanosis, no edema. Capillary refill <3 seconds  Integument: skin warm and dry. No rashes.   Neurologic: GCS 15, no focal deficits, symmetric strength 5/5 in the upper and lower

## 2025-07-19 NOTE — H&P
GENERAL SURGERY  HISTORY AND PHYSICAL  7/19/2025    Chief Complaint   Patient presents with    Abdominal Pain     C/O RUQ pain with emesis     HPI  Wenceslao Hu is a 54 y.o. male who presents to the general surgery service for evaluation of possible acute cholecystitis. He was seen by our service in the ED in February of 2025. At the time the conversation for surgery was offered and the patient stated that he wanted to go home to care for his wife. He was Dc'd with follow-up scheduled. He followed up with Dr. Benson who worked him up for diarrhea and blood in stools. He had a EGD and C scope in April 2025 that showed duodenal polyps, possible hyperplastic distal sigmoid polyp that was unable to be found again during the scope. All pathology was negative and Dr. Benson reccommended cholecystectomy on 6/05. Today he presents with pain that started while he was at work. He endorses nausea and vomiting throughout the day. He states that he would rather get his gallbladder out now rather than continue to follow up outpatient.    Medical history is significant for HTN. He has had a previous inguinal hernia repair but no other abdominal surgeries.The patient is not taking any blood thinning medications.    Past Medical History:   Diagnosis Date    Adrenal adenoma     Arthritis     Asthma     Back pain     COPD (chronic obstructive pulmonary disease) (HCC)     Diabetes mellitus (HCC)     Family history of breast cancer     Hypertension     Morbid obesity (HCC)     Mucopurulent chronic bronchitis (HCC) 03/19/2018    Obstructive sleep apnea syndrome 03/19/2018    Pulmonary nodule 03/19/2018    Sleep apnea     Cpap 15 cm BMS    Smoker        Past Surgical History:   Procedure Laterality Date    COLONOSCOPY N/A 4/21/2025    COLONOSCOPY BIOPSY performed by Nataly Benson MD at [facility] ENDOSCOPY    CYST REMOVAL      HERNIA REPAIR      PAIN MANAGEMENT PROCEDURE N/A 5/4/2023    LUMBAR EPIDURAL STEROID INJECTION UNDER

## 2025-07-20 ENCOUNTER — ANESTHESIA (OUTPATIENT)
Dept: OPERATING ROOM | Age: 55
End: 2025-07-20
Payer: MEDICARE

## 2025-07-20 ENCOUNTER — ANESTHESIA EVENT (OUTPATIENT)
Dept: OPERATING ROOM | Age: 55
End: 2025-07-20
Payer: MEDICARE

## 2025-07-20 ENCOUNTER — APPOINTMENT (OUTPATIENT)
Dept: GENERAL RADIOLOGY | Age: 55
DRG: 418 | End: 2025-07-20
Payer: MEDICARE

## 2025-07-20 PROBLEM — K81.1 CHRONIC CHOLECYSTITIS: Status: ACTIVE | Noted: 2025-07-20

## 2025-07-20 LAB
ANION GAP SERPL CALCULATED.3IONS-SCNC: 9 MMOL/L (ref 7–16)
BASOPHILS # BLD: 0.01 K/UL (ref 0–0.2)
BASOPHILS NFR BLD: 0 % (ref 0–2)
BUN SERPL-MCNC: 8 MG/DL (ref 6–20)
CALCIUM SERPL-MCNC: 8.4 MG/DL (ref 8.6–10)
CHLORIDE SERPL-SCNC: 108 MMOL/L (ref 98–107)
CO2 SERPL-SCNC: 24 MMOL/L (ref 22–29)
CREAT SERPL-MCNC: 0.9 MG/DL (ref 0.7–1.2)
EOSINOPHIL # BLD: 0 K/UL (ref 0.05–0.5)
EOSINOPHILS RELATIVE PERCENT: 0 % (ref 0–6)
ERYTHROCYTE [DISTWIDTH] IN BLOOD BY AUTOMATED COUNT: 14.8 % (ref 11.5–15)
GFR, ESTIMATED: >90 ML/MIN/1.73M2
GLUCOSE SERPL-MCNC: 118 MG/DL (ref 74–99)
HCT VFR BLD AUTO: 39.1 % (ref 37–54)
HGB BLD-MCNC: 13.5 G/DL (ref 12.5–16.5)
IMM GRANULOCYTES # BLD AUTO: 0.04 K/UL (ref 0–0.58)
IMM GRANULOCYTES NFR BLD: 0 % (ref 0–5)
INR PPP: 1.3
LYMPHOCYTES NFR BLD: 1.79 K/UL (ref 1.5–4)
LYMPHOCYTES RELATIVE PERCENT: 20 % (ref 20–42)
MAGNESIUM SERPL-MCNC: 1.9 MG/DL (ref 1.6–2.6)
MCH RBC QN AUTO: 31.2 PG (ref 26–35)
MCHC RBC AUTO-ENTMCNC: 34.5 G/DL (ref 32–34.5)
MCV RBC AUTO: 90.3 FL (ref 80–99.9)
MONOCYTES NFR BLD: 1.06 K/UL (ref 0.1–0.95)
MONOCYTES NFR BLD: 12 % (ref 2–12)
NEUTROPHILS NFR BLD: 68 % (ref 43–80)
NEUTS SEG NFR BLD: 6.18 K/UL (ref 1.8–7.3)
PARTIAL THROMBOPLASTIN TIME: 28.7 SEC (ref 24.5–35.1)
PLATELET # BLD AUTO: 169 K/UL (ref 130–450)
PMV BLD AUTO: 10.4 FL (ref 7–12)
POTASSIUM SERPL-SCNC: 3.3 MMOL/L (ref 3.5–5.1)
PROTHROMBIN TIME: 13.8 SEC (ref 9.3–12.4)
RBC # BLD AUTO: 4.33 M/UL (ref 3.8–5.8)
SODIUM SERPL-SCNC: 141 MMOL/L (ref 136–145)
WBC OTHER # BLD: 9.1 K/UL (ref 4.5–11.5)

## 2025-07-20 PROCEDURE — 3700000000 HC ANESTHESIA ATTENDED CARE: Performed by: SURGERY

## 2025-07-20 PROCEDURE — 3700000001 HC ADD 15 MINUTES (ANESTHESIA): Performed by: SURGERY

## 2025-07-20 PROCEDURE — 93005 ELECTROCARDIOGRAM TRACING: CPT | Performed by: SURGERY

## 2025-07-20 PROCEDURE — 2500000003 HC RX 250 WO HCPCS: Performed by: SURGERY

## 2025-07-20 PROCEDURE — 3600000015 HC SURGERY LEVEL 5 ADDTL 15MIN: Performed by: SURGERY

## 2025-07-20 PROCEDURE — 85025 COMPLETE CBC W/AUTO DIFF WBC: CPT

## 2025-07-20 PROCEDURE — 71045 X-RAY EXAM CHEST 1 VIEW: CPT

## 2025-07-20 PROCEDURE — 6360000002 HC RX W HCPCS

## 2025-07-20 PROCEDURE — 80048 BASIC METABOLIC PNL TOTAL CA: CPT

## 2025-07-20 PROCEDURE — 2580000003 HC RX 258

## 2025-07-20 PROCEDURE — 7100000000 HC PACU RECOVERY - FIRST 15 MIN: Performed by: SURGERY

## 2025-07-20 PROCEDURE — 2500000003 HC RX 250 WO HCPCS

## 2025-07-20 PROCEDURE — 7100000001 HC PACU RECOVERY - ADDTL 15 MIN: Performed by: SURGERY

## 2025-07-20 PROCEDURE — 88304 TISSUE EXAM BY PATHOLOGIST: CPT

## 2025-07-20 PROCEDURE — 2709999900 HC NON-CHARGEABLE SUPPLY: Performed by: SURGERY

## 2025-07-20 PROCEDURE — 3600000005 HC SURGERY LEVEL 5 BASE: Performed by: SURGERY

## 2025-07-20 PROCEDURE — 85730 THROMBOPLASTIN TIME PARTIAL: CPT

## 2025-07-20 PROCEDURE — 6360000002 HC RX W HCPCS: Performed by: ANESTHESIOLOGY

## 2025-07-20 PROCEDURE — 83735 ASSAY OF MAGNESIUM: CPT

## 2025-07-20 PROCEDURE — 6370000000 HC RX 637 (ALT 250 FOR IP)

## 2025-07-20 PROCEDURE — 0FT44ZZ RESECTION OF GALLBLADDER, PERCUTANEOUS ENDOSCOPIC APPROACH: ICD-10-PCS | Performed by: SURGERY

## 2025-07-20 PROCEDURE — 85610 PROTHROMBIN TIME: CPT

## 2025-07-20 PROCEDURE — 47562 LAPAROSCOPIC CHOLECYSTECTOMY: CPT | Performed by: SURGERY

## 2025-07-20 PROCEDURE — 2720000010 HC SURG SUPPLY STERILE: Performed by: SURGERY

## 2025-07-20 PROCEDURE — 1200000000 HC SEMI PRIVATE

## 2025-07-20 PROCEDURE — 36415 COLL VENOUS BLD VENIPUNCTURE: CPT

## 2025-07-20 PROCEDURE — 99024 POSTOP FOLLOW-UP VISIT: CPT | Performed by: SURGERY

## 2025-07-20 RX ORDER — HYDRALAZINE HYDROCHLORIDE 20 MG/ML
10 INJECTION INTRAMUSCULAR; INTRAVENOUS
Status: DISCONTINUED | OUTPATIENT
Start: 2025-07-20 | End: 2025-07-20 | Stop reason: HOSPADM

## 2025-07-20 RX ORDER — ONDANSETRON 2 MG/ML
4 INJECTION INTRAMUSCULAR; INTRAVENOUS
Status: DISCONTINUED | OUTPATIENT
Start: 2025-07-20 | End: 2025-07-20 | Stop reason: HOSPADM

## 2025-07-20 RX ORDER — CEFTRIAXONE 1 G/1
INJECTION, POWDER, FOR SOLUTION INTRAMUSCULAR; INTRAVENOUS
Status: DISCONTINUED | OUTPATIENT
Start: 2025-07-20 | End: 2025-07-20 | Stop reason: SDUPTHER

## 2025-07-20 RX ORDER — PROCHLORPERAZINE EDISYLATE 5 MG/ML
5 INJECTION INTRAMUSCULAR; INTRAVENOUS
Status: DISCONTINUED | OUTPATIENT
Start: 2025-07-20 | End: 2025-07-20 | Stop reason: HOSPADM

## 2025-07-20 RX ORDER — POTASSIUM CHLORIDE 7.45 MG/ML
10 INJECTION INTRAVENOUS
Status: DISPENSED | OUTPATIENT
Start: 2025-07-20 | End: 2025-07-20

## 2025-07-20 RX ORDER — ACETAMINOPHEN 325 MG/1
650 TABLET ORAL EVERY 6 HOURS
Status: DISCONTINUED | OUTPATIENT
Start: 2025-07-20 | End: 2025-07-21 | Stop reason: HOSPADM

## 2025-07-20 RX ORDER — MIDAZOLAM HYDROCHLORIDE 2 MG/2ML
2 INJECTION, SOLUTION INTRAMUSCULAR; INTRAVENOUS
Status: DISCONTINUED | OUTPATIENT
Start: 2025-07-20 | End: 2025-07-20 | Stop reason: HOSPADM

## 2025-07-20 RX ORDER — ROCURONIUM BROMIDE 10 MG/ML
INJECTION, SOLUTION INTRAVENOUS
Status: DISCONTINUED | OUTPATIENT
Start: 2025-07-20 | End: 2025-07-20 | Stop reason: SDUPTHER

## 2025-07-20 RX ORDER — SODIUM CHLORIDE 9 MG/ML
INJECTION, SOLUTION INTRAVENOUS
Status: DISCONTINUED | OUTPATIENT
Start: 2025-07-20 | End: 2025-07-20 | Stop reason: SDUPTHER

## 2025-07-20 RX ORDER — PROPOFOL 10 MG/ML
INJECTION, EMULSION INTRAVENOUS
Status: DISCONTINUED | OUTPATIENT
Start: 2025-07-20 | End: 2025-07-20 | Stop reason: SDUPTHER

## 2025-07-20 RX ORDER — OXYCODONE HYDROCHLORIDE 5 MG/1
5 TABLET ORAL EVERY 4 HOURS PRN
Status: DISCONTINUED | OUTPATIENT
Start: 2025-07-20 | End: 2025-07-21 | Stop reason: HOSPADM

## 2025-07-20 RX ORDER — BUPIVACAINE HYDROCHLORIDE AND EPINEPHRINE 5; 5 MG/ML; UG/ML
INJECTION, SOLUTION EPIDURAL; INTRACAUDAL; PERINEURAL PRN
Status: DISCONTINUED | OUTPATIENT
Start: 2025-07-20 | End: 2025-07-20 | Stop reason: ALTCHOICE

## 2025-07-20 RX ORDER — SODIUM CHLORIDE 0.9 % (FLUSH) 0.9 %
5-40 SYRINGE (ML) INJECTION PRN
Status: DISCONTINUED | OUTPATIENT
Start: 2025-07-20 | End: 2025-07-20 | Stop reason: HOSPADM

## 2025-07-20 RX ORDER — HYDROMORPHONE HYDROCHLORIDE 1 MG/ML
0.25 INJECTION, SOLUTION INTRAMUSCULAR; INTRAVENOUS; SUBCUTANEOUS EVERY 5 MIN PRN
Status: DISCONTINUED | OUTPATIENT
Start: 2025-07-20 | End: 2025-07-20 | Stop reason: HOSPADM

## 2025-07-20 RX ORDER — OXYCODONE HYDROCHLORIDE 10 MG/1
10 TABLET ORAL EVERY 4 HOURS PRN
Status: DISCONTINUED | OUTPATIENT
Start: 2025-07-20 | End: 2025-07-21 | Stop reason: HOSPADM

## 2025-07-20 RX ORDER — OXYCODONE HYDROCHLORIDE 5 MG/1
5 TABLET ORAL EVERY 6 HOURS PRN
Qty: 28 TABLET | Refills: 0 | Status: SHIPPED | OUTPATIENT
Start: 2025-07-20 | End: 2025-07-27

## 2025-07-20 RX ORDER — HYDROMORPHONE HYDROCHLORIDE 1 MG/ML
0.5 INJECTION, SOLUTION INTRAMUSCULAR; INTRAVENOUS; SUBCUTANEOUS EVERY 5 MIN PRN
Status: DISCONTINUED | OUTPATIENT
Start: 2025-07-20 | End: 2025-07-20 | Stop reason: HOSPADM

## 2025-07-20 RX ORDER — MEPERIDINE HYDROCHLORIDE 25 MG/ML
12.5 INJECTION INTRAMUSCULAR; INTRAVENOUS; SUBCUTANEOUS EVERY 5 MIN PRN
Status: DISCONTINUED | OUTPATIENT
Start: 2025-07-20 | End: 2025-07-20 | Stop reason: HOSPADM

## 2025-07-20 RX ORDER — IPRATROPIUM BROMIDE AND ALBUTEROL SULFATE 2.5; .5 MG/3ML; MG/3ML
1 SOLUTION RESPIRATORY (INHALATION)
Status: DISCONTINUED | OUTPATIENT
Start: 2025-07-20 | End: 2025-07-20 | Stop reason: HOSPADM

## 2025-07-20 RX ORDER — FENTANYL CITRATE 50 UG/ML
INJECTION, SOLUTION INTRAMUSCULAR; INTRAVENOUS
Status: DISCONTINUED | OUTPATIENT
Start: 2025-07-20 | End: 2025-07-20 | Stop reason: SDUPTHER

## 2025-07-20 RX ORDER — LABETALOL HYDROCHLORIDE 5 MG/ML
10 INJECTION, SOLUTION INTRAVENOUS
Status: DISCONTINUED | OUTPATIENT
Start: 2025-07-20 | End: 2025-07-20 | Stop reason: HOSPADM

## 2025-07-20 RX ORDER — LIDOCAINE HYDROCHLORIDE 20 MG/ML
INJECTION, SOLUTION INTRAVENOUS
Status: DISCONTINUED | OUTPATIENT
Start: 2025-07-20 | End: 2025-07-20 | Stop reason: SDUPTHER

## 2025-07-20 RX ORDER — DEXAMETHASONE SODIUM PHOSPHATE 10 MG/ML
INJECTION, SOLUTION INTRA-ARTICULAR; INTRALESIONAL; INTRAMUSCULAR; INTRAVENOUS; SOFT TISSUE
Status: DISCONTINUED | OUTPATIENT
Start: 2025-07-20 | End: 2025-07-20 | Stop reason: SDUPTHER

## 2025-07-20 RX ORDER — ONDANSETRON 2 MG/ML
INJECTION INTRAMUSCULAR; INTRAVENOUS
Status: DISCONTINUED | OUTPATIENT
Start: 2025-07-20 | End: 2025-07-20 | Stop reason: SDUPTHER

## 2025-07-20 RX ORDER — SODIUM CHLORIDE 0.9 % (FLUSH) 0.9 %
5-40 SYRINGE (ML) INJECTION EVERY 12 HOURS SCHEDULED
Status: DISCONTINUED | OUTPATIENT
Start: 2025-07-20 | End: 2025-07-20 | Stop reason: HOSPADM

## 2025-07-20 RX ORDER — SODIUM CHLORIDE 9 MG/ML
INJECTION, SOLUTION INTRAVENOUS PRN
Status: DISCONTINUED | OUTPATIENT
Start: 2025-07-20 | End: 2025-07-20 | Stop reason: HOSPADM

## 2025-07-20 RX ORDER — DEXMEDETOMIDINE HYDROCHLORIDE 100 UG/ML
INJECTION, SOLUTION INTRAVENOUS
Status: DISCONTINUED | OUTPATIENT
Start: 2025-07-20 | End: 2025-07-20 | Stop reason: SDUPTHER

## 2025-07-20 RX ADMIN — FENTANYL CITRATE 100 MCG: 50 INJECTION, SOLUTION INTRAMUSCULAR; INTRAVENOUS at 11:43

## 2025-07-20 RX ADMIN — ROCURONIUM BROMIDE 15 MG: 10 INJECTION, SOLUTION INTRAVENOUS at 12:54

## 2025-07-20 RX ADMIN — LIDOCAINE HYDROCHLORIDE 80 MG: 20 INJECTION, SOLUTION INTRAVENOUS at 11:43

## 2025-07-20 RX ADMIN — ROCURONIUM BROMIDE 20 MG: 10 INJECTION, SOLUTION INTRAVENOUS at 12:33

## 2025-07-20 RX ADMIN — CEFTRIAXONE SODIUM 2 G: 2 INJECTION, POWDER, FOR SOLUTION INTRAMUSCULAR; INTRAVENOUS at 12:03

## 2025-07-20 RX ADMIN — ONDANSETRON HYDROCHLORIDE 4 MG: 2 SOLUTION INTRAMUSCULAR; INTRAVENOUS at 13:23

## 2025-07-20 RX ADMIN — SODIUM CHLORIDE: 9 INJECTION, SOLUTION INTRAVENOUS at 11:40

## 2025-07-20 RX ADMIN — DEXAMETHASONE SODIUM PHOSPHATE 10 MG: 10 INJECTION INTRAMUSCULAR; INTRAVENOUS at 12:04

## 2025-07-20 RX ADMIN — DEXMEDETOMIDINE HCL 8 MCG: 100 INJECTION INTRAVENOUS at 12:23

## 2025-07-20 RX ADMIN — HYDROMORPHONE HYDROCHLORIDE 0.5 MG: 1 INJECTION, SOLUTION INTRAMUSCULAR; INTRAVENOUS; SUBCUTANEOUS at 13:51

## 2025-07-20 RX ADMIN — POTASSIUM CHLORIDE 10 MEQ: 7.46 INJECTION, SOLUTION INTRAVENOUS at 08:24

## 2025-07-20 RX ADMIN — METRONIDAZOLE 500 MG: 500 INJECTION, SOLUTION INTRAVENOUS at 08:29

## 2025-07-20 RX ADMIN — DEXMEDETOMIDINE HCL 4 MCG: 100 INJECTION INTRAVENOUS at 12:33

## 2025-07-20 RX ADMIN — FENTANYL CITRATE 50 MCG: 50 INJECTION, SOLUTION INTRAMUSCULAR; INTRAVENOUS at 12:33

## 2025-07-20 RX ADMIN — OXYCODONE HYDROCHLORIDE 10 MG: 10 TABLET ORAL at 16:45

## 2025-07-20 RX ADMIN — SODIUM CHLORIDE, SODIUM LACTATE, POTASSIUM CHLORIDE, AND CALCIUM CHLORIDE: .6; .31; .03; .02 INJECTION, SOLUTION INTRAVENOUS at 00:59

## 2025-07-20 RX ADMIN — PROPOFOL 50 MG: 10 INJECTION, EMULSION INTRAVENOUS at 11:51

## 2025-07-20 RX ADMIN — POTASSIUM CHLORIDE 10 MEQ: 7.46 INJECTION, SOLUTION INTRAVENOUS at 09:26

## 2025-07-20 RX ADMIN — SODIUM CHLORIDE, SODIUM LACTATE, POTASSIUM CHLORIDE, AND CALCIUM CHLORIDE: .6; .31; .03; .02 INJECTION, SOLUTION INTRAVENOUS at 08:26

## 2025-07-20 RX ADMIN — FENTANYL CITRATE 50 MCG: 50 INJECTION, SOLUTION INTRAMUSCULAR; INTRAVENOUS at 12:20

## 2025-07-20 RX ADMIN — SODIUM CHLORIDE, PRESERVATIVE FREE 10 ML: 5 INJECTION INTRAVENOUS at 21:35

## 2025-07-20 RX ADMIN — OXYCODONE HYDROCHLORIDE 10 MG: 10 TABLET ORAL at 21:34

## 2025-07-20 RX ADMIN — HYDROMORPHONE HYDROCHLORIDE 0.5 MG: 1 INJECTION, SOLUTION INTRAMUSCULAR; INTRAVENOUS; SUBCUTANEOUS at 13:57

## 2025-07-20 RX ADMIN — SODIUM CHLORIDE, PRESERVATIVE FREE 10 ML: 5 INJECTION INTRAVENOUS at 03:01

## 2025-07-20 RX ADMIN — METRONIDAZOLE 500 MG: 500 INJECTION, SOLUTION INTRAVENOUS at 02:00

## 2025-07-20 RX ADMIN — SUGAMMADEX 100 MG: 100 INJECTION, SOLUTION INTRAVENOUS at 13:31

## 2025-07-20 RX ADMIN — SODIUM CHLORIDE, PRESERVATIVE FREE 10 ML: 5 INJECTION INTRAVENOUS at 08:31

## 2025-07-20 RX ADMIN — ACETAMINOPHEN 650 MG: 325 TABLET ORAL at 21:33

## 2025-07-20 RX ADMIN — SUGAMMADEX 200 MG: 100 INJECTION, SOLUTION INTRAVENOUS at 13:30

## 2025-07-20 RX ADMIN — PROPOFOL 200 MG: 10 INJECTION, EMULSION INTRAVENOUS at 11:43

## 2025-07-20 RX ADMIN — DEXMEDETOMIDINE HCL 4 MCG: 100 INJECTION INTRAVENOUS at 13:19

## 2025-07-20 RX ADMIN — ROCURONIUM BROMIDE 50 MG: 10 INJECTION, SOLUTION INTRAVENOUS at 11:43

## 2025-07-20 ASSESSMENT — PAIN DESCRIPTION - LOCATION
LOCATION: ABDOMEN
LOCATION: ABDOMEN
LOCATION: FLANK
LOCATION: ABDOMEN

## 2025-07-20 ASSESSMENT — PAIN DESCRIPTION - ONSET: ONSET: SUDDEN

## 2025-07-20 ASSESSMENT — PAIN DESCRIPTION - ORIENTATION
ORIENTATION: RIGHT
ORIENTATION: RIGHT
ORIENTATION: MID
ORIENTATION: MID

## 2025-07-20 ASSESSMENT — PAIN DESCRIPTION - PAIN TYPE
TYPE: SURGICAL PAIN

## 2025-07-20 ASSESSMENT — PAIN - FUNCTIONAL ASSESSMENT
PAIN_FUNCTIONAL_ASSESSMENT: ACTIVITIES ARE NOT PREVENTED
PAIN_FUNCTIONAL_ASSESSMENT: ACTIVITIES ARE NOT PREVENTED

## 2025-07-20 ASSESSMENT — PAIN SCALES - GENERAL
PAINLEVEL_OUTOF10: 8
PAINLEVEL_OUTOF10: 0
PAINLEVEL_OUTOF10: 5
PAINLEVEL_OUTOF10: 8
PAINLEVEL_OUTOF10: 3
PAINLEVEL_OUTOF10: 8
PAINLEVEL_OUTOF10: 6

## 2025-07-20 ASSESSMENT — PAIN DESCRIPTION - DESCRIPTORS
DESCRIPTORS: ACHING;SORE
DESCRIPTORS: DISCOMFORT;TENDER;ACHING
DESCRIPTORS: ACHING;SORE

## 2025-07-20 ASSESSMENT — PAIN DESCRIPTION - FREQUENCY: FREQUENCY: INTERMITTENT

## 2025-07-20 NOTE — PLAN OF CARE
Problem: Chronic Conditions and Co-morbidities  Goal: Patient's chronic conditions and co-morbidity symptoms are monitored and maintained or improved  7/20/2025 0743 by Faisal Carmen RN  Outcome: Progressing  7/20/2025 0113 by Lashae Parker RN  Outcome: Progressing     Problem: Discharge Planning  Goal: Discharge to home or other facility with appropriate resources  7/20/2025 0743 by Faisal Carmen RN  Outcome: Progressing  7/20/2025 0113 by Lashae Parker RN  Outcome: Progressing

## 2025-07-20 NOTE — OP NOTE
Operative Note      Patient: Wenceslao Hu  YOB: 1970  MRN: [id number]    Date of Procedure: 7/20/2025    Pre-Op Diagnosis Codes:      * Acute cholecystitis [K81.0]    Post-Op Diagnosis: Same, acute on chronic cholecystitis       Procedure(s):  CHOLECYSTECTOMY LAPAROSCOPIC    Surgeon(s):  Antonio Waite MD    Assistant:   Resident: Brooke Quiroz DO; Segun Sierra DO    Anesthesia: General    Estimated Blood Loss (mL): less than 50     Complications: None    Specimens:   ID Type Source Tests Collected by Time Destination   A : GALLBLADDER FOR PATHOLOGY Tissue Gallbladder SURGICAL PATHOLOGY Antonio Waite MD 7/20/2025 1310        Implants:  * No implants in log *      Drains: * No LDAs found *    Findings:  Present At Time Of Surgery (PATOS) (choose all levels that have infection present):  - Organ Space infection (below fascia) present as evidenced by fluid consistent with infection  Other Findings: Significantly chronically inflamed infected appearing gallbladder with significant gallbladder wall thickening and large gallstone within the gallbladder       Indications for procedure:   This is a 54-year-old male with history of recurrent abdominal pain.  He had undergone endoscopic workup earlier this year.  He was recommended cholecystectomy, but was never able to follow-up for the procedure.  He returned to the emergency department yesterday for evaluation of recurrent right upper quadrant pain.  Imaging and workup were consistent with acute cholecystitis.  Laparoscopic cholecystectomy was discussed with the patient.  The risks/benefits/alternatives to the procedure were discussed with the patient/responsible party, and they have agreed to undergo the procedure.    Details of procedure:  The patient was brought to the operating room and positioned supine on the OR table.  All pressure points were adequately padded.  SCDs were applied to the bilateral lower extremities and ensured to be  functioning properly.  The patient was secured to the table with a safety strap.  General anesthesia was administered per anesthesia protocol.  The patient had been receiving scheduled IV ceftriaxone and Flagyl perioperatively.  IV ceftriaxone was administered immediately prior to incision.  The abdomen and all surrounding areas were prepped and draped in the usual sterile fashion.    At Godfrey's point, a Veress needle was inserted.  Intraperitoneal placement was confirmed with a saline drop test.  The abdomen was then insufflated to a working pressure of 15 mmHg.  Using scalpel, a 5 mm supraumbilical incision was made.  A 5 mm trocar was inserted through this incision.  No intra-abdominal injury occurred with trocar insertion. Under direct visualization, a 10 mm subxiphoid port, and two 5 mm right sided subcostal trocars were inserted.  The gallbladder was thickened, edematous, and appeared to be chronically inflamed.  Using atraumatic graspers through the right sided ports, the gallbladder was retracted cephalad and laterally.  Using a combination of [place] and hook electrocautery, the hepatocystic triangle was dissected.  The critical view of safety was obtained without issue.  The cystic duct was clipped 3 times distally towards the patient, and 1 time proximally towards the gallbladder.  The cystic artery was clipped twice distally towards the patient and once proximally towards the gallbladder.  The cystic duct and artery were both divided between clips using laparoscopic scissors.  The remaining attachments of the gallbladder to the liver were then divided using hook electrocautery.  Once freed, the gallbladder was placed in a laparoscopic bag and removed through the subxiphoid port site.  A laparoscopic suction  was used to thoroughly irrigate the abdomen including the cystic plate and area surrounding the liver.  All fluid was suctioned completely until the aspirate returned clear.  The cystic

## 2025-07-20 NOTE — PROGRESS NOTES
GENERAL SURGERY  DAILY PROGRESS NOTE  7/20/2025    Subjective:  Patient laying in bed, accompanied by his wife.  No acute events overnight.    Objective:  BP (!) 153/90   Pulse 86   Temp 100 °F (37.8 °C) (Oral)   Resp 18   Ht 1.778 m (5' 10\")   Wt 133.4 kg (294 lb)   SpO2 96%   BMI 42.18 kg/m²     General Appearance:  awake, alert, oriented, in no acute distress  Skin:  Skin color, texture, turgor normal  Head/face:  NCAT  Eyes:  No gross abnormalities. Sclera nonicteric  Lungs/Chest:  Normal expansion. No respiratory distress. On room air  Heart: Warm throughout. Regular rate, mildly hypertensive  Abdomen: Soft, minimally distended, right-sided abdominal tenderness without guarding or rigidity  Extremities: Extremities warm to touch, pink, with no edema.      I have personally reviewed all relevant labs and imaging.    Assessment/Plan:  54 y.o. male with acute on chronic cholecystitis    N.p.o. and IV fluids  Multimodal pain control antiemetics  OR today for laparoscopic cholecystectomy  Continue Rocephin and Flagyl    Risks include but limited to: Infection, blood loss, bleeding, postoperative pain, injury to local structures such as the common bile duct or liver, need for further procedures, benefits, alternatives, expected outcomes were thoroughly discussed and the patient is agreeable to proceed with surgery    Electronically signed by Jesse Adams DO on 7/20/2025 at 8:00 AM       Attending Attestation   I saw and examined the patient, I agree with resident note      Hx taken from patient and chart    I personally reviewed the CT imaging, and labs      53 yo with chronic cholecystitis with worsening symptoms     I discussed surgery with him, we discussed that this will likely be difficult given chronicity of sx and based of his CT scan findings.  Discussed laparoscopic surgery with possible conversion to open.  Discussed the potential for complications including bile duct injury requiring complex  reconstruction.   Patient and family present and understand and wish to proceed with surgery.     Antonio Waite MD FACS

## 2025-07-20 NOTE — BRIEF OP NOTE
Brief Postoperative Note      Patient: Wenceslao Hu  YOB: 1970  MRN: [id number]    Date of Procedure: 7/20/2025    Pre-Op Diagnosis Codes:      * Acute cholecystitis [K81.0]    Post-Op Diagnosis: Same       Procedure(s):  CHOLECYSTECTOMY LAPAROSCOPIC    Surgeon(s):  Antonio Waite MD    Assistant:  Resident: Brooke Quiroz DO; Segun Sierra DO    Anesthesia: General    Estimated Blood Loss (mL): less than 50     Complications: None    Specimens:   ID Type Source Tests Collected by Time Destination   A : GALLBLADDER FOR PATHOLOGY Tissue Gallbladder SURGICAL PATHOLOGY Antonio Waite MD 7/20/2025 1310        Implants:  * No implants in log *      Drains: * No LDAs found *    Findings:  Present At Time Of Surgery (PATOS) (choose all levels that have infection present):  - Organ Space infection (below fascia) present as evidenced by fluid consistent with infection  Other Findings: Significantly chronically inflamed infected appearing gallbladder with significant gallbladder wall thickening and large gallstone within the gallbladder    Electronically signed by Brooke Quiroz DO on 7/20/2025 at 1:46 PM

## 2025-07-20 NOTE — PROGRESS NOTES
4 Eyes Skin Assessment     NAME:  Wenceslao Hu  YOB: 1970  MEDICAL RECORD NUMBER:  [id number]    The patient is being assessed for  Admission    I agree that at least one RN has performed a thorough Head to Toe Skin Assessment on the patient. ALL assessment sites listed below have been assessed.      Areas assessed by both nurses:    Head, Face, Ears, Shoulders, Back, Chest, Arms, Elbows, Hands, Sacrum. Buttock, Coccyx, Ischium, and Legs. Feet and Heels        Does the Patient have a Wound? No noted wound(s)       Umair Prevention initiated by RN: Yes  Wound Care Orders initiated by RN: No    For hospital-acquired stage 1 & 2 and ALL Stage 3,4, Unstageable, DTI, NWPT, and Complex wounds: place order “IP Wound Care/Ostomy Nurse Eval and Treat” by RN under : No    New Ostomies, if present place, Ostomy referral order under : No     Nurse 1 eSignature: Electronically signed by Lashae Parker RN on 7/20/25 at 1:38 AM EDT    **SHARE this note so that the co-signing nurse can place an eSignature**    Nurse 2 eSignature: Electronically signed by Emanuel Shi RN on 7/20/25 at 1:39 AM EDT

## 2025-07-21 VITALS
HEIGHT: 70 IN | TEMPERATURE: 98.3 F | HEART RATE: 77 BPM | OXYGEN SATURATION: 98 % | SYSTOLIC BLOOD PRESSURE: 140 MMHG | WEIGHT: 294 LBS | RESPIRATION RATE: 18 BRPM | BODY MASS INDEX: 42.09 KG/M2 | DIASTOLIC BLOOD PRESSURE: 99 MMHG

## 2025-07-21 LAB
ALBUMIN SERPL-MCNC: 3.4 G/DL (ref 3.5–5.2)
ALP SERPL-CCNC: 68 U/L (ref 40–129)
ALT SERPL-CCNC: 66 U/L (ref 0–50)
ANION GAP SERPL CALCULATED.3IONS-SCNC: 9 MMOL/L (ref 7–16)
AST SERPL-CCNC: 83 U/L (ref 0–50)
BASOPHILS # BLD: 0.01 K/UL (ref 0–0.2)
BASOPHILS NFR BLD: 0 % (ref 0–2)
BILIRUB SERPL-MCNC: 0.6 MG/DL (ref 0–1.2)
BUN SERPL-MCNC: 8 MG/DL (ref 6–20)
CALCIUM SERPL-MCNC: 8.8 MG/DL (ref 8.6–10)
CHLORIDE SERPL-SCNC: 107 MMOL/L (ref 98–107)
CO2 SERPL-SCNC: 24 MMOL/L (ref 22–29)
CREAT SERPL-MCNC: 0.9 MG/DL (ref 0.7–1.2)
EOSINOPHIL # BLD: 0 K/UL (ref 0.05–0.5)
EOSINOPHILS RELATIVE PERCENT: 0 % (ref 0–6)
ERYTHROCYTE [DISTWIDTH] IN BLOOD BY AUTOMATED COUNT: 14.7 % (ref 11.5–15)
GFR, ESTIMATED: >90 ML/MIN/1.73M2
GLUCOSE SERPL-MCNC: 103 MG/DL (ref 74–99)
HCT VFR BLD AUTO: 41 % (ref 37–54)
HGB BLD-MCNC: 13.9 G/DL (ref 12.5–16.5)
IMM GRANULOCYTES # BLD AUTO: 0.03 K/UL (ref 0–0.58)
IMM GRANULOCYTES NFR BLD: 0 % (ref 0–5)
LYMPHOCYTES NFR BLD: 1.89 K/UL (ref 1.5–4)
LYMPHOCYTES RELATIVE PERCENT: 16 % (ref 20–42)
MCH RBC QN AUTO: 30.9 PG (ref 26–35)
MCHC RBC AUTO-ENTMCNC: 33.9 G/DL (ref 32–34.5)
MCV RBC AUTO: 91.1 FL (ref 80–99.9)
MONOCYTES NFR BLD: 1.15 K/UL (ref 0.1–0.95)
MONOCYTES NFR BLD: 10 % (ref 2–12)
NEUTROPHILS NFR BLD: 73 % (ref 43–80)
NEUTS SEG NFR BLD: 8.49 K/UL (ref 1.8–7.3)
PLATELET # BLD AUTO: 178 K/UL (ref 130–450)
PMV BLD AUTO: 10.2 FL (ref 7–12)
POTASSIUM SERPL-SCNC: 3.6 MMOL/L (ref 3.5–5.1)
PROT SERPL-MCNC: 6.5 G/DL (ref 6.4–8.3)
RBC # BLD AUTO: 4.5 M/UL (ref 3.8–5.8)
SODIUM SERPL-SCNC: 141 MMOL/L (ref 136–145)
WBC OTHER # BLD: 11.6 K/UL (ref 4.5–11.5)

## 2025-07-21 PROCEDURE — 6370000000 HC RX 637 (ALT 250 FOR IP)

## 2025-07-21 PROCEDURE — 80053 COMPREHEN METABOLIC PANEL: CPT

## 2025-07-21 PROCEDURE — 36415 COLL VENOUS BLD VENIPUNCTURE: CPT

## 2025-07-21 PROCEDURE — 85025 COMPLETE CBC W/AUTO DIFF WBC: CPT

## 2025-07-21 RX ORDER — HYDRALAZINE HYDROCHLORIDE 25 MG/1
25 TABLET, FILM COATED ORAL 2 TIMES DAILY
Status: DISCONTINUED | OUTPATIENT
Start: 2025-07-21 | End: 2025-07-21 | Stop reason: HOSPADM

## 2025-07-21 RX ORDER — ALBUTEROL SULFATE 0.83 MG/ML
2.5 SOLUTION RESPIRATORY (INHALATION) 4 TIMES DAILY PRN
Status: DISCONTINUED | OUTPATIENT
Start: 2025-07-21 | End: 2025-07-21 | Stop reason: HOSPADM

## 2025-07-21 RX ORDER — ALBUTEROL SULFATE 90 UG/1
2 INHALANT RESPIRATORY (INHALATION) 4 TIMES DAILY PRN
Status: DISCONTINUED | OUTPATIENT
Start: 2025-07-21 | End: 2025-07-21 | Stop reason: SDUPTHER

## 2025-07-21 RX ORDER — LOSARTAN POTASSIUM 50 MG/1
100 TABLET ORAL DAILY
Status: DISCONTINUED | OUTPATIENT
Start: 2025-07-21 | End: 2025-07-21 | Stop reason: HOSPADM

## 2025-07-21 RX ORDER — MONTELUKAST SODIUM 10 MG/1
10 TABLET ORAL NIGHTLY
Status: DISCONTINUED | OUTPATIENT
Start: 2025-07-21 | End: 2025-07-21 | Stop reason: HOSPADM

## 2025-07-21 RX ORDER — HYDRALAZINE HYDROCHLORIDE 20 MG/ML
10 INJECTION INTRAMUSCULAR; INTRAVENOUS EVERY 4 HOURS PRN
Status: DISCONTINUED | OUTPATIENT
Start: 2025-07-21 | End: 2025-07-21 | Stop reason: HOSPADM

## 2025-07-21 RX ORDER — POLYETHYLENE GLYCOL 3350 17 G/17G
17 POWDER, FOR SOLUTION ORAL DAILY PRN
Qty: 510 G | Refills: 0 | Status: SHIPPED | OUTPATIENT
Start: 2025-07-21 | End: 2025-08-20

## 2025-07-21 RX ORDER — HYDROCHLOROTHIAZIDE 25 MG/1
25 TABLET ORAL DAILY
Status: DISCONTINUED | OUTPATIENT
Start: 2025-07-21 | End: 2025-07-21 | Stop reason: HOSPADM

## 2025-07-21 RX ORDER — LABETALOL HYDROCHLORIDE 5 MG/ML
10 INJECTION, SOLUTION INTRAVENOUS EVERY 4 HOURS PRN
Status: DISCONTINUED | OUTPATIENT
Start: 2025-07-21 | End: 2025-07-21 | Stop reason: HOSPADM

## 2025-07-21 RX ORDER — BUDESONIDE 0.25 MG/2ML
0.25 INHALANT ORAL
Status: DISCONTINUED | OUTPATIENT
Start: 2025-07-21 | End: 2025-07-21 | Stop reason: HOSPADM

## 2025-07-21 RX ORDER — ARFORMOTEROL TARTRATE 15 UG/2ML
15 SOLUTION RESPIRATORY (INHALATION)
Status: DISCONTINUED | OUTPATIENT
Start: 2025-07-21 | End: 2025-07-21 | Stop reason: HOSPADM

## 2025-07-21 RX ADMIN — LOSARTAN POTASSIUM 100 MG: 50 TABLET, FILM COATED ORAL at 09:50

## 2025-07-21 RX ADMIN — HYDRALAZINE HYDROCHLORIDE 25 MG: 25 TABLET ORAL at 09:51

## 2025-07-21 RX ADMIN — ACETAMINOPHEN 650 MG: 325 TABLET ORAL at 09:49

## 2025-07-21 RX ADMIN — ACETAMINOPHEN 650 MG: 325 TABLET ORAL at 02:54

## 2025-07-21 RX ADMIN — HYDROCHLOROTHIAZIDE 25 MG: 25 TABLET ORAL at 09:50

## 2025-07-21 RX ADMIN — OXYCODONE HYDROCHLORIDE 10 MG: 10 TABLET ORAL at 05:31

## 2025-07-21 ASSESSMENT — PAIN DESCRIPTION - ORIENTATION
ORIENTATION: RIGHT
ORIENTATION: RIGHT;LEFT
ORIENTATION: RIGHT

## 2025-07-21 ASSESSMENT — PAIN DESCRIPTION - LOCATION
LOCATION: FLANK
LOCATION: ABDOMEN
LOCATION: FLANK

## 2025-07-21 ASSESSMENT — PAIN DESCRIPTION - DESCRIPTORS
DESCRIPTORS: ACHING;DISCOMFORT;SORE
DESCRIPTORS: DISCOMFORT;TENDER;SORE
DESCRIPTORS: ACHING;TENDER;DISCOMFORT

## 2025-07-21 ASSESSMENT — PAIN - FUNCTIONAL ASSESSMENT
PAIN_FUNCTIONAL_ASSESSMENT: ACTIVITIES ARE NOT PREVENTED

## 2025-07-21 ASSESSMENT — PAIN SCALES - GENERAL
PAINLEVEL_OUTOF10: 7
PAINLEVEL_OUTOF10: 6
PAINLEVEL_OUTOF10: 7

## 2025-07-21 NOTE — PROGRESS NOTES
Wenceslao Hu was ordered Nucala (currently on HOLD) which is a nonformulary medication.  This medication will need to be supplied by the patient as the pharmacy does not carry this non-formulary medication.    If the medication has not been administered by 1400 on the following day from the time the order was placed, a pharmacist will follow-up with the nurse of the patient to assess the capability of the patient to bring in the medication.    If it is determined that the patient cannot supply the medication and it is not available to be dispensed from the pharmacy, the provider will be notified.

## 2025-07-21 NOTE — CARE COORDINATION
7/21/2025social work transition of care planning  Pt is from home with family and had been independent. Pt pcp is [facility] and pharmacy is [facility] [address]. Explained sw role in transition of care planning. Pt plan is home,family to transport.  Electronically signed by RONI Lion on 7/21/2025 at 9:37 AM

## 2025-07-21 NOTE — DISCHARGE SUMMARY
Physician Discharge Summary     Patient ID:  Wenceslao Hu  [id number]  54 y.o.  1970    Admit date: 7/19/2025    Discharge date and time: 07/21/25, 6:22 AM     Admitting Physician: Lainey Mckeon MD     Admission Diagnoses: Acute cholecystitis [K81.0]    Discharge Diagnoses: Principal Problem:    Acute cholecystitis  Active Problems:    Chronic cholecystitis  Resolved Problems:    * No resolved hospital problems. *      Admission Condition: fair    Discharged Condition: stable      Hospital Course:  Wenceslao Hu is a 54 y.o. male who presented with abdominal pain. Work up revealed chronic cholecystitis. He underwent laparoscopic cholecystectomy on 7/20/25. The patient's course was otherwise uneventful. He progressed well, pain was controlled on PO medications. He was tolerating a regular diet with no nausea or vomiting, and was in a suitable condition for discharge to home in stable condition.      Consults:   IP CONSULT TO GENERAL SURGERY    Significant Diagnostic Studies:   XR CHEST PORTABLE  Result Date: 7/20/2025  EXAMINATION: ONE XRAY VIEW OF THE CHEST 7/20/2025 1:36 am COMPARISON: CT chest on 03/19/2018 HISTORY: ORDERING SYSTEM PROVIDED HISTORY: preop TECHNOLOGIST PROVIDED HISTORY: Reason for exam:->preop FINDINGS: The heart size is normal.  There is no acute lung infiltrate or edema.  No pleural fluid or pneumothorax is present.  The skeletal structures are unremarkable.     No acute cardiopulmonary process.     US GALLBLADDER RUQ  Result Date: 7/19/2025  EXAMINATION: RIGHT UPPER QUADRANT ULTRASOUND 7/19/2025 2:17 pm COMPARISON: None. HISTORY: ORDERING SYSTEM PROVIDED HISTORY: ruq pain TECHNOLOGIST PROVIDED HISTORY: Reason for exam:->ruq pain What reading provider will be dictating this exam?->CRC FINDINGS: BILIARY SYSTEM: Cholelithiasis with gallbladder wall thickening up to 10 mm negative sonographic Mcmillan's sign.  Changes of adenomyomatosis.  Common bile duct is within normal limits measuring

## 2025-07-21 NOTE — PROGRESS NOTES
GENERAL SURGERY  DAILY PROGRESS NOTE  7/21/2025    Subjective:  NAEO  HDS, AF  No complaints this AM  Comfortably in bed with wife at bedside      Objective:  /83   Pulse 67   Temp 97.4 °F (36.3 °C) (Temporal)   Resp 18   Ht 1.778 m (5' 10\")   Wt 133.4 kg (294 lb)   SpO2 98%   BMI 42.18 kg/m²     General Appearance:  awake, alert, oriented, in no acute distress  Skin:  Skin color, texture, turgor normal  Head/face:  NCAT  Eyes:  No gross abnormalities. Sclera nonicteric  Lungs/Chest:  Normal expansion. No respiratory distress. On room air  Heart: Warm throughout. Regular rate, mildly hypertensive  Abdomen: Soft, minimally distended, right-sided abdominal tenderness without guarding or rigidity; incisions c/d/i  Extremities: Extremities warm to touch, pink, with no edema.      I have personally reviewed all relevant labs and imaging.    Assessment/Plan:  54 y.o. male with acute on chronic cholecystitis    Tolerated procedure well  Tolerated regular diet  Multimodal pain control antiemetics  Continue Rocephin and Flagyl with transition to PO on discharge  Medically stable for discharge home today 7/21    Seen and discussed with Dr. Medina      Electronically signed by Camron Cheatham DO on 7/21/2025 at 6:13 AM

## 2025-07-21 NOTE — PROGRESS NOTES
Dr. Roslyn jamison served for patient bp of 154/105 and informing home medication list has been completed on admission.

## 2025-07-23 LAB
EKG ATRIAL RATE: 76 BPM
EKG P AXIS: 56 DEGREES
EKG P-R INTERVAL: 186 MS
EKG Q-T INTERVAL: 402 MS
EKG QRS DURATION: 98 MS
EKG QTC CALCULATION (BAZETT): 452 MS
EKG R AXIS: 6 DEGREES
EKG T AXIS: 31 DEGREES
EKG VENTRICULAR RATE: 76 BPM

## 2025-07-23 PROCEDURE — 93010 ELECTROCARDIOGRAM REPORT: CPT | Performed by: INTERNAL MEDICINE

## 2025-07-24 LAB — SURGICAL PATHOLOGY REPORT: NORMAL

## 2025-07-25 ENCOUNTER — TELEPHONE (OUTPATIENT)
Age: 55
End: 2025-07-25

## 2025-07-25 NOTE — TELEPHONE ENCOUNTER
MA returned pt's call to schedule follow up for lap zulma on 7/20 with Dr. Mckeon, pt sis not answer and unable to LVM as VM is full.    Electronically signed by Becky Chau on 7/25/2025 at 4:30 PM

## 2025-08-04 ENCOUNTER — TELEPHONE (OUTPATIENT)
Age: 55
End: 2025-08-04

## 2025-08-11 ENCOUNTER — OFFICE VISIT (OUTPATIENT)
Age: 55
End: 2025-08-11

## 2025-08-11 VITALS
HEART RATE: 76 BPM | BODY MASS INDEX: 40.8 KG/M2 | OXYGEN SATURATION: 97 % | DIASTOLIC BLOOD PRESSURE: 79 MMHG | SYSTOLIC BLOOD PRESSURE: 127 MMHG | TEMPERATURE: 97.5 F | RESPIRATION RATE: 16 BRPM | HEIGHT: 70 IN | WEIGHT: 285 LBS

## 2025-08-11 DIAGNOSIS — J82.83 EOSINOPHILIC ASTHMA: Primary | ICD-10-CM

## 2025-08-11 DIAGNOSIS — K81.0 ACUTE CHOLECYSTITIS: Primary | ICD-10-CM

## 2025-08-11 RX ORDER — MEPOLIZUMAB 100 MG/ML
100 INJECTION, SOLUTION SUBCUTANEOUS ONCE
Qty: 0.84 ML | Refills: 0 | Status: SHIPPED | COMMUNITY
Start: 2025-08-11 | End: 2025-08-11

## (undated) DEVICE — AIR/WATER CLEANING ADAPTER FOR OLYMPUS® GI ENDOSCOPE: Brand: BULLDOG®

## (undated) DEVICE — BITEBLOCK 54FR W/ DENT RIM BLOX

## (undated) DEVICE — INSUFFLATION NEEDLE TO ESTABLISH PNEUMOPERITONEUM.: Brand: INSUFFLATION NEEDLE

## (undated) DEVICE — DISSECTOR LAP DIA5MM BLNT TIP ENDOPATH

## (undated) DEVICE — SYRINGE, LUER LOCK, 5ML: Brand: MEDLINE

## (undated) DEVICE — SET INSUF TUBE HEAT ISO CONN DISP

## (undated) DEVICE — CONNECTOR IRRIGATION AUXILIARY H2O JET W/ PRT MTL THRD HYDR

## (undated) DEVICE — PUMP SUC IRR TBNG L10FT W/ HNDPC ASSEMB STRYKEFLOW 2

## (undated) DEVICE — ANCHOR TISSUE RETRIEVAL SYSTEM, BAG SIZE 175 ML, PORT SIZE 10 MM: Brand: ANCHOR TISSUE RETRIEVAL SYSTEM

## (undated) DEVICE — FORCEPS BX 240CM 2.4MM L NDL RAD JAW 4 M00513334

## (undated) DEVICE — DEFENDO AIR WATER SUCTION AND BIOPSY VALVE KIT FOR  OLYMPUS: Brand: DEFENDO AIR/WATER/SUCTION AND BIOPSY VALVE

## (undated) DEVICE — TROCAR: Brand: KII FIOS FIRST ENTRY

## (undated) DEVICE — 3M™ RED DOT™ MONITORING ELECTRODE WITH FOAM TAPE AND STICKY GEL 2560, 50/BAG, 20/CASE, 72/PLT: Brand: RED DOT™

## (undated) DEVICE — NEEDLE HYPO 25GA L1.5IN BLU POLYPR HUB S STL REG BVL STR

## (undated) DEVICE — CONTAINER SPEC 60ML PH 7NEUTRAL BUFF FRMLN RDY TO USE

## (undated) DEVICE — ELECTRODE PT RET AD L9FT HI MOIST COND ADH HYDRGEL CORDED

## (undated) DEVICE — FORCEP BX LG CAP 2.4 MMX120 CM W/ NDL YEL RADIAL JAW 4 DISP

## (undated) DEVICE — APPLIER CLP L SHFT DIA12MM 20 ROT MULT LIGACLP

## (undated) DEVICE — GLOVE SURG SZ 8 L12IN FNGR THK79MIL GRN LTX FREE

## (undated) DEVICE — GENERAL LAPAROSCOPY: Brand: MEDLINE INDUSTRIES, INC.

## (undated) DEVICE — 12 ML SYRINGE,LUER-LOCK TIP: Brand: MONOJECT

## (undated) DEVICE — GAUZE,SPONGE,4"X4",8PLY,STRL,LF,10/TRAY: Brand: MEDLINE

## (undated) DEVICE — SOL IRR SOD CHL 0.9% TITAN XL CNTNR 3000ML

## (undated) DEVICE — Device: Brand: PORTEX

## (undated) DEVICE — BANDAGE ADH W1XL3IN NAT FAB WVN FLX DURABLE N ADH PD SEAL

## (undated) DEVICE — GLOVE ORANGE PI 7 1/2   MSG9075

## (undated) DEVICE — ELECTRODE ES 36CM LAP FLAT L HK COAT DISP CLEANCOAT

## (undated) DEVICE — 6 ML SYRINGE LUER-LOCK TIP: Brand: MONOJECT

## (undated) DEVICE — LAPAROSCOPIC SCISSORS: Brand: EPIX LAPAROSCOPIC SCISSORS

## (undated) DEVICE — NON-DEHP CATHETER EXTENSION SET, MALE LUER LOCK ADAPTER

## (undated) DEVICE — GAUZE,SPONGE,4"X4",12PLY,STERILE,LF,2'S: Brand: MEDLINE

## (undated) DEVICE — NEEDLE HYPO 18GA L1.5IN PNK POLYPR HUB S STL THN WALL FILL

## (undated) DEVICE — SYRINGE MED 50ML LUERSLIP TIP

## (undated) DEVICE — AGENT HEMSTAT W4XL4IN OXIDIZED REGENERATED CELOS STRUCTURED

## (undated) DEVICE — TROCAR: Brand: KII® SLEEVE

## (undated) DEVICE — ENDOSCOPIC KIT 1.1+ OP4 NO CP DE